# Patient Record
Sex: FEMALE | Race: WHITE | NOT HISPANIC OR LATINO | RURAL
[De-identification: names, ages, dates, MRNs, and addresses within clinical notes are randomized per-mention and may not be internally consistent; named-entity substitution may affect disease eponyms.]

---

## 2017-03-30 ENCOUNTER — LAB (OUTPATIENT)
Dept: LAB | Facility: CLINIC | Age: 72
End: 2017-03-30

## 2017-03-30 ENCOUNTER — OFFICE VISIT (OUTPATIENT)
Dept: ENDOCRINOLOGY | Facility: CLINIC | Age: 72
End: 2017-03-30

## 2017-03-30 VITALS
DIASTOLIC BLOOD PRESSURE: 60 MMHG | SYSTOLIC BLOOD PRESSURE: 100 MMHG | HEART RATE: 72 BPM | WEIGHT: 152 LBS | HEIGHT: 67 IN | BODY MASS INDEX: 23.86 KG/M2

## 2017-03-30 DIAGNOSIS — E78.49 OTHER HYPERLIPIDEMIA: ICD-10-CM

## 2017-03-30 DIAGNOSIS — E55.9 VITAMIN D DEFICIENCY: ICD-10-CM

## 2017-03-30 DIAGNOSIS — I10 ESSENTIAL HYPERTENSION: ICD-10-CM

## 2017-03-30 DIAGNOSIS — Z91.199 NONCOMPLIANCE WITH DIABETES TREATMENT: ICD-10-CM

## 2017-03-30 DIAGNOSIS — IMO0002 UNCONTROLLED TYPE 2 DIABETES MELLITUS WITH COMPLICATION, WITH LONG-TERM CURRENT USE OF INSULIN: ICD-10-CM

## 2017-03-30 DIAGNOSIS — IMO0002 UNCONTROLLED TYPE 2 DIABETES MELLITUS WITH COMPLICATION, WITH LONG-TERM CURRENT USE OF INSULIN: Primary | ICD-10-CM

## 2017-03-30 PROCEDURE — 80053 COMPREHEN METABOLIC PANEL: CPT | Performed by: NURSE PRACTITIONER

## 2017-03-30 PROCEDURE — 85025 COMPLETE CBC W/AUTO DIFF WBC: CPT | Performed by: NURSE PRACTITIONER

## 2017-03-30 PROCEDURE — 83036 HEMOGLOBIN GLYCOSYLATED A1C: CPT | Performed by: NURSE PRACTITIONER

## 2017-03-30 PROCEDURE — 99214 OFFICE O/P EST MOD 30 MIN: CPT | Performed by: NURSE PRACTITIONER

## 2017-03-30 PROCEDURE — 82306 VITAMIN D 25 HYDROXY: CPT | Performed by: NURSE PRACTITIONER

## 2017-03-30 NOTE — PROGRESS NOTES
Subjective    Jamilah Vee is a 71 y.o. female. she is here today for follow-up.    History of Present Illness         Duration/Timing:  Diabetes mellitus type 2, Age at onset of diabetes: 61 years, Onset of symptoms gradual  timing - constant     quality - not controlled     severity - high     Severity (Complications/Hospitalizations)  Secondary Macrovascular Complications:  CAD   2 stents in May 2014  ICD - defibrillator  Secondary Microvascular Complications:  No Diabetic Nephropathy, No proteinuria, No Diabetic Retinopathy, No Diabetic Neuropathy     Context  Diabetes Regimen:  Insulin, Oral Medications, Last HgbA1c% 11.4 October 2016   Blood Glucose Readings  states some improvement in numbers     she still forgets sometimes to cover the meals     and she is having a nighttime snack  Diet  low carb intake  Exercise:  Does not exercise     Associated Signs/Symptoms  Hyperglycemic Symptoms:  No polyuria, No polydipsia, No polyphagia  Hypoglycemic Episodes:  No documented hypoglycemia  Modifying Factors/Comorbidities:  Hypertension, Hyperlipidemia            The following portions of the patient's history were reviewed and updated as appropriate:   Past Medical History:   Diagnosis Date   • Dyslipidemia    • Essential hypertension    • Hypercalcemia    • Osteopenia    • Type II diabetes mellitus, uncontrolled    • Vitamin D deficiency      Past Surgical History:   Procedure Laterality Date   • PACEMAKER IMPLANTATION      Pacemaker AICD pocket (1)       Family History   Problem Relation Age of Onset   • Coronary artery disease Other    • Hypertension Other      OB History     No data available        Current Outpatient Prescriptions   Medication Sig Dispense Refill   • aspirin 81 MG tablet Take 81 mg by mouth daily.     • atorvastatin (LIPITOR) 40 MG tablet Take 40 mg by mouth daily.     • clopidogrel (PLAVIX) 75 MG tablet Take 75 mg by mouth daily.     • digoxin (LANOXIN) 125 MCG tablet Take 125 mcg by mouth  every day.     • furosemide (LASIX) 40 MG tablet Take 40 mg by mouth daily.     • glimepiride (AMARYL) 4 MG tablet Take 4 mg by mouth every morning before breakfast.     • insulin aspart (NOVOLOG FLEXPEN) 100 UNIT/ML solution pen-injector sc pen Inject 10-15 Units under the skin 3 (three) times a day before meals.     • Insulin Glargine (LANTUS SOLOSTAR) 100 UNIT/ML injection pen Inject 45 Units under the skin every night.     • insulin glargine (LANTUS) 100 UNIT/ML injection Inject 45 Units under the skin every night.     • Insulin Pen Needle (PEN NEEDLES) 31G X 8 MM misc 4 (four) times a day. Use as indicated 4 times daily.     • losartan (COZAAR) 25 MG tablet Take 25 mg by mouth daily.     • metFORMIN (GLUCOPHAGE) 1000 MG tablet Take 1,000 mg by mouth 2 (two) times a day.     • metoprolol tartrate (LOPRESSOR) 50 MG tablet Take 50 mg by mouth 2 (two) times a day.     • omeprazole (PriLOSEC) 20 MG capsule Take 20 mg by mouth daily.     • spironolactone (ALDACTONE) 25 MG tablet Take 25 mg by mouth daily.       No current facility-administered medications for this visit.      No Known Allergies  Social History     Social History   • Marital status: Unknown     Spouse name: N/A   • Number of children: N/A   • Years of education: N/A     Social History Main Topics   • Smoking status: Never Smoker   • Smokeless tobacco: None   • Alcohol use None   • Drug use: None   • Sexual activity: Not Asked     Other Topics Concern   • None     Social History Narrative       Review of Systems  Review of Systems   Constitutional: Negative for activity change, appetite change, chills, diaphoresis and fatigue.   HENT: Negative for congestion, dental problem, drooling, ear discharge, ear pain, facial swelling, sneezing, sore throat, tinnitus, trouble swallowing and voice change.    Eyes: Negative for photophobia, pain, discharge, redness, itching and visual disturbance.   Respiratory: Negative for apnea, cough, choking, chest tightness  "and shortness of breath.    Cardiovascular: Negative for chest pain, palpitations and leg swelling.   Gastrointestinal: Negative for abdominal distention, abdominal pain, constipation, diarrhea, nausea and vomiting.   Endocrine: Negative for cold intolerance, heat intolerance, polydipsia, polyphagia and polyuria.   Genitourinary: Negative for difficulty urinating, dysuria, frequency, hematuria and urgency.   Musculoskeletal: Negative for arthralgias, back pain, gait problem, joint swelling, myalgias, neck pain and neck stiffness.   Skin: Negative for color change, pallor, rash and wound.   Allergic/Immunologic: Negative for environmental allergies, food allergies and immunocompromised state.   Neurological: Negative for dizziness, tremors, facial asymmetry, weakness, light-headedness, numbness and headaches.   Hematological: Negative for adenopathy. Does not bruise/bleed easily.   Psychiatric/Behavioral: Negative for agitation, behavioral problems, confusion, decreased concentration and sleep disturbance.        Objective    /60 (BP Location: Left arm, Patient Position: Sitting, Cuff Size: Adult)  Pulse 72  Ht 66.5\" (168.9 cm)  Wt 152 lb (68.9 kg)  BMI 24.17 kg/m2  Physical Exam   Constitutional: She is oriented to person, place, and time. She appears well-developed and well-nourished. No distress.   HENT:   Head: Normocephalic and atraumatic.   Right Ear: External ear normal.   Left Ear: External ear normal.   Nose: Nose normal.   Eyes: Conjunctivae and EOM are normal. Pupils are equal, round, and reactive to light.   Neck: Normal range of motion. Neck supple. No tracheal deviation present. No thyromegaly present.   Cardiovascular: Normal rate, regular rhythm and normal heart sounds.    No murmur heard.  Pulmonary/Chest: Effort normal and breath sounds normal. No respiratory distress. She has no wheezes.   Abdominal: Soft. Bowel sounds are normal. There is no tenderness. There is no rebound and no " guarding.   Musculoskeletal: Normal range of motion. She exhibits no edema, tenderness or deformity.   Neurological: She is alert and oriented to person, place, and time. No cranial nerve deficit.   Skin: Skin is warm and dry. No rash noted.   Psychiatric: She has a normal mood and affect. Her behavior is normal. Judgment and thought content normal.       Lab Review  Glucose (mg/dl)   Date Value   10/06/2016 193 (H)   06/30/2016 228 (H)   02/05/2016 246 (H)     Sodium (mmol/L)   Date Value   10/06/2016 140   06/30/2016 137   02/05/2016 136 (L)     Potassium (mmol/L)   Date Value   10/06/2016 5.1   06/30/2016 4.9   02/05/2016 5.0     Chloride (mmol/L)   Date Value   10/06/2016 98   06/30/2016 95   02/05/2016 92 (L)     CO2 (mmol/L)   Date Value   10/06/2016 30   06/30/2016 29   02/05/2016 30     BUN (mg/dl)   Date Value   10/06/2016 23 (H)   06/30/2016 20   02/05/2016 25 (H)     Creatinine (mg/dl)   Date Value   10/06/2016 1.1 (H)   06/30/2016 0.9   02/05/2016 1.2 (H)     Hemoglobin A1C (%TotHgb)   Date Value   10/06/2016 11.4 (H)   06/30/2016 10.6 (H)   02/05/2016 11.2 (H)     Triglycerides (mg/dl)   Date Value   10/06/2016 193   06/30/2016 283 (H)   08/25/2015 330 (H)       Assessment/Plan      1. Uncontrolled type 2 diabetes mellitus with complication, with long-term current use of insulin    2. Essential hypertension    3. Other hyperlipidemia    4. Vitamin D deficiency    .    Medications prescribed:  Outpatient Encounter Prescriptions as of 3/30/2017   Medication Sig Dispense Refill   • aspirin 81 MG tablet Take 81 mg by mouth daily.     • atorvastatin (LIPITOR) 40 MG tablet Take 40 mg by mouth daily.     • clopidogrel (PLAVIX) 75 MG tablet Take 75 mg by mouth daily.     • digoxin (LANOXIN) 125 MCG tablet Take 125 mcg by mouth every day.     • furosemide (LASIX) 40 MG tablet Take 40 mg by mouth daily.     • glimepiride (AMARYL) 4 MG tablet Take 4 mg by mouth every morning before breakfast.     • insulin aspart  (NOVOLOG FLEXPEN) 100 UNIT/ML solution pen-injector sc pen Inject 10-15 Units under the skin 3 (three) times a day before meals.     • Insulin Glargine (LANTUS SOLOSTAR) 100 UNIT/ML injection pen Inject 45 Units under the skin every night.     • insulin glargine (LANTUS) 100 UNIT/ML injection Inject 45 Units under the skin every night.     • Insulin Pen Needle (PEN NEEDLES) 31G X 8 MM misc 4 (four) times a day. Use as indicated 4 times daily.     • losartan (COZAAR) 25 MG tablet Take 25 mg by mouth daily.     • metFORMIN (GLUCOPHAGE) 1000 MG tablet Take 1,000 mg by mouth 2 (two) times a day.     • metoprolol tartrate (LOPRESSOR) 50 MG tablet Take 50 mg by mouth 2 (two) times a day.     • omeprazole (PriLOSEC) 20 MG capsule Take 20 mg by mouth daily.     • spironolactone (ALDACTONE) 25 MG tablet Take 25 mg by mouth daily.       No facility-administered encounter medications on file as of 3/30/2017.        Orders placed during this encounter include:  Orders Placed This Encounter   Procedures   • Comprehensive Metabolic Panel   • Hemoglobin A1c   • Vitamin D 25 Hydroxy         Glycemic Management  Lantus  45 units --increase to 55--decrease to 45 units--change to [place] due to uncontrolled blood sugars - increase to 55 units         if you wake up with a sugar in the morning  less than 100 -- decrease by 5 units and don't raise it again      --------     Janumet 100/1000 mg with supper----stopped due to expensive      metformin 1000mg one at supper --not taking -- try taking 500 mg         ----------     stop glimepiride since not enough        -----------     Novolog  every meal-----forgets to take --please take with every meal         151-200 : 2 units   201-250 : 4units  251-300 : 6units  above 300 : 7 units     +     2 units for every 15 grams of carbohydrate for supper---taking  3 units per 15 grams of CHO-increase to 4 units per 15 grams of CHO              please cover your nighttime snack       Lipid  Management      ldl - 61 feb. 2015     TG - 300     does not want to take any mediations - will try fish oil 1gram two capsules po BID     May 2015     tg - 300     did not start fish oil   refuses prescription medication     June 2016     Tg- 283     Oct. 2016     Total chol - 134  Tg- 193  HDL - 36  LDL - 59      Blood Pressure Management  on metoprolol 50 bid  ---Decrease to 1/2 tablet po BID  aldactone 25 mg qd  losartan 25 mg qd  furosemide 40 mg qd      Microvascular Complication Monitoring    eye exam -- les sthan one year      Bone Health           Oct. 2015     DXA - osteopenia forearm     taking vit d and caclium     vit a - nl  vit d 1-25 - nl     June 2016     vit d - nl  calcium 10.3     Oct. 2016     Vit d  - nl  Calcium - 10.0     Other Diabetes Related Aspects    Feb. 2015     TSH - nl     Feb. 2016     TSH - nl          Need new labs today    4. Follow-up: Return in about 4 months (around 7/30/2017) for Recheck.

## 2017-03-31 LAB
25(OH)D3 SERPL-MCNC: 18.3 NG/ML (ref 30–100)
ALBUMIN SERPL-MCNC: 4.2 G/DL (ref 3.4–4.8)
ALBUMIN/GLOB SERPL: 1.4 G/DL (ref 1.1–1.8)
ALP SERPL-CCNC: 47 U/L (ref 38–126)
ALT SERPL W P-5'-P-CCNC: 31 U/L (ref 9–52)
ANION GAP SERPL CALCULATED.3IONS-SCNC: 13 MMOL/L (ref 5–15)
AST SERPL-CCNC: 30 U/L (ref 14–36)
BASOPHILS # BLD AUTO: 0.04 10*3/MM3 (ref 0–0.2)
BASOPHILS NFR BLD AUTO: 0.6 % (ref 0–2)
BILIRUB SERPL-MCNC: 1.1 MG/DL (ref 0.2–1.3)
BUN BLD-MCNC: 21 MG/DL (ref 7–21)
BUN/CREAT SERPL: 19.1 (ref 7–25)
CALCIUM SPEC-SCNC: 10.8 MG/DL (ref 8.4–10.2)
CHLORIDE SERPL-SCNC: 95 MMOL/L (ref 95–110)
CO2 SERPL-SCNC: 30 MMOL/L (ref 22–31)
CREAT BLD-MCNC: 1.1 MG/DL (ref 0.5–1)
DEPRECATED RDW RBC AUTO: 44.1 FL (ref 36.4–46.3)
EOSINOPHIL # BLD AUTO: 0.1 10*3/MM3 (ref 0–0.7)
EOSINOPHIL NFR BLD AUTO: 1.4 % (ref 0–7)
ERYTHROCYTE [DISTWIDTH] IN BLOOD BY AUTOMATED COUNT: 13.9 % (ref 11.5–14.5)
GFR SERPL CREATININE-BSD FRML MDRD: 49 ML/MIN/1.73 (ref 60–90)
GLOBULIN UR ELPH-MCNC: 2.9 GM/DL (ref 2.3–3.5)
GLUCOSE BLD-MCNC: 117 MG/DL (ref 60–100)
HBA1C MFR BLD: 13.83 % (ref 4–5.6)
HCT VFR BLD AUTO: 42.8 % (ref 35–45)
HGB BLD-MCNC: 14.3 G/DL (ref 12–15.5)
IMM GRANULOCYTES # BLD: 0.02 10*3/MM3 (ref 0–0.02)
IMM GRANULOCYTES NFR BLD: 0.3 % (ref 0–0.5)
LYMPHOCYTES # BLD AUTO: 1.38 10*3/MM3 (ref 0.6–4.2)
LYMPHOCYTES NFR BLD AUTO: 19.2 % (ref 10–50)
MCH RBC QN AUTO: 29.1 PG (ref 26.5–34)
MCHC RBC AUTO-ENTMCNC: 33.4 G/DL (ref 31.4–36)
MCV RBC AUTO: 87.2 FL (ref 80–98)
MONOCYTES # BLD AUTO: 0.62 10*3/MM3 (ref 0–0.9)
MONOCYTES NFR BLD AUTO: 8.6 % (ref 0–12)
NEUTROPHILS # BLD AUTO: 5.01 10*3/MM3 (ref 2–8.6)
NEUTROPHILS NFR BLD AUTO: 69.9 % (ref 37–80)
PLATELET # BLD AUTO: 153 10*3/MM3 (ref 150–450)
PMV BLD AUTO: 13.1 FL (ref 8–12)
POTASSIUM BLD-SCNC: 4.3 MMOL/L (ref 3.5–5.1)
PROT SERPL-MCNC: 7.1 G/DL (ref 6.3–8.6)
RBC # BLD AUTO: 4.91 10*6/MM3 (ref 3.77–5.16)
SODIUM BLD-SCNC: 138 MMOL/L (ref 137–145)
WBC NRBC COR # BLD: 7.17 10*3/MM3 (ref 3.2–9.8)

## 2017-04-03 ENCOUNTER — TELEPHONE (OUTPATIENT)
Dept: ENDOCRINOLOGY | Facility: CLINIC | Age: 72
End: 2017-04-03

## 2017-04-03 RX ORDER — ERGOCALCIFEROL 1.25 MG/1
50000 CAPSULE ORAL
Qty: 5 CAPSULE | Refills: 5 | Status: SHIPPED | OUTPATIENT
Start: 2017-04-03 | End: 2019-06-20

## 2017-04-03 NOTE — TELEPHONE ENCOUNTER
----- Message from ZAHIRA Sher sent at 4/3/2017  7:55 AM CDT -----  A1c is 13.8 higher than previous of 11.4; the average blood glucose is 370; really needs to make sure giving the insulin; watch diet the A1c needs to be 7 or less

## 2017-04-03 NOTE — TELEPHONE ENCOUNTER
----- Message from ZAHIRA Sher sent at 4/3/2017  7:55 AM CDT -----  Needs vitamin d 50,000 units weekly level is 18.3

## 2017-06-13 ENCOUNTER — TELEPHONE (OUTPATIENT)
Dept: ENDOCRINOLOGY | Facility: CLINIC | Age: 72
End: 2017-06-13

## 2017-07-26 ENCOUNTER — TELEPHONE (OUTPATIENT)
Dept: ENDOCRINOLOGY | Facility: CLINIC | Age: 72
End: 2017-07-26

## 2017-07-26 NOTE — TELEPHONE ENCOUNTER
PT IS NEEDING A REFILL ON HER FREESTYLE LIGHT TESTING STRIPS AND PLEASE SEND THEM TO THE "[facility] IN [place], [place]. THANK YOU TP

## 2017-08-03 ENCOUNTER — TELEPHONE (OUTPATIENT)
Dept: ENDOCRINOLOGY | Facility: CLINIC | Age: 72
End: 2017-08-03

## 2017-11-08 ENCOUNTER — TELEPHONE (OUTPATIENT)
Dept: ENDOCRINOLOGY | Facility: CLINIC | Age: 72
End: 2017-11-08

## 2017-11-08 DIAGNOSIS — E55.9 VITAMIN D DEFICIENCY: Primary | ICD-10-CM

## 2017-11-08 DIAGNOSIS — E78.49 OTHER HYPERLIPIDEMIA: ICD-10-CM

## 2017-11-08 DIAGNOSIS — Z79.4 CONTROLLED TYPE 2 DIABETES MELLITUS WITHOUT COMPLICATION, WITH LONG-TERM CURRENT USE OF INSULIN (HCC): ICD-10-CM

## 2017-11-08 DIAGNOSIS — E11.9 CONTROLLED TYPE 2 DIABETES MELLITUS WITHOUT COMPLICATION, WITH LONG-TERM CURRENT USE OF INSULIN (HCC): ICD-10-CM

## 2017-11-14 LAB
25(OH)D3 SERPL-MCNC: 16.3 NG/ML (ref 30–100)
ALBUMIN SERPL-MCNC: 4 G/DL (ref 3.4–4.8)
ALBUMIN UR-MCNC: <0.6 MG/L
ALBUMIN/GLOB SERPL: 1.2 G/DL (ref 1.1–1.8)
ALP SERPL-CCNC: 62 U/L (ref 38–126)
ALT SERPL W P-5'-P-CCNC: 57 U/L (ref 9–52)
ANION GAP SERPL CALCULATED.3IONS-SCNC: 15 MMOL/L (ref 5–15)
ARTICHOKE IGE QN: 53 MG/DL (ref 1–129)
AST SERPL-CCNC: 53 U/L (ref 14–36)
BASOPHILS # BLD AUTO: 0.02 10*3/MM3 (ref 0–0.2)
BASOPHILS NFR BLD AUTO: 0.3 % (ref 0–2)
BILIRUB SERPL-MCNC: 0.7 MG/DL (ref 0.2–1.3)
BUN BLD-MCNC: 30 MG/DL (ref 7–21)
BUN/CREAT SERPL: 21.4 (ref 7–25)
CALCIUM SPEC-SCNC: 9.6 MG/DL (ref 8.4–10.2)
CHLORIDE SERPL-SCNC: 95 MMOL/L (ref 95–110)
CHOLEST SERPL-MCNC: 127 MG/DL (ref 0–199)
CO2 SERPL-SCNC: 33 MMOL/L (ref 22–31)
CREAT BLD-MCNC: 1.4 MG/DL (ref 0.5–1)
CREAT UR-MCNC: 142.9 MG/DL
CREAT UR-MCNC: 142.9 MG/DL
DEPRECATED RDW RBC AUTO: 53.5 FL (ref 36.4–46.3)
EOSINOPHIL # BLD AUTO: 0.06 10*3/MM3 (ref 0–0.7)
EOSINOPHIL NFR BLD AUTO: 0.8 % (ref 0–7)
ERYTHROCYTE [DISTWIDTH] IN BLOOD BY AUTOMATED COUNT: 16.2 % (ref 11.5–14.5)
GFR SERPL CREATININE-BSD FRML MDRD: 37 ML/MIN/1.73 (ref 39–90)
GLOBULIN UR ELPH-MCNC: 3.3 GM/DL (ref 2.3–3.5)
GLUCOSE BLD-MCNC: 98 MG/DL (ref 60–100)
HCT VFR BLD AUTO: 41.2 % (ref 35–45)
HDLC SERPL-MCNC: 50 MG/DL (ref 60–200)
HGB BLD-MCNC: 13.1 G/DL (ref 12–15.5)
IMM GRANULOCYTES # BLD: 0.02 10*3/MM3 (ref 0–0.02)
IMM GRANULOCYTES NFR BLD: 0.3 % (ref 0–0.5)
LDLC/HDLC SERPL: 0.98 {RATIO} (ref 0–3.22)
LYMPHOCYTES # BLD AUTO: 0.84 10*3/MM3 (ref 0.6–4.2)
LYMPHOCYTES NFR BLD AUTO: 11.6 % (ref 10–50)
MCH RBC QN AUTO: 28.4 PG (ref 26.5–34)
MCHC RBC AUTO-ENTMCNC: 31.8 G/DL (ref 31.4–36)
MCV RBC AUTO: 89.4 FL (ref 80–98)
MICROALBUMIN/CREAT UR: NORMAL MG/G (ref 0–30)
MONOCYTES # BLD AUTO: 0.68 10*3/MM3 (ref 0–0.9)
MONOCYTES NFR BLD AUTO: 9.4 % (ref 0–12)
NEUTROPHILS # BLD AUTO: 5.63 10*3/MM3 (ref 2–8.6)
NEUTROPHILS NFR BLD AUTO: 77.6 % (ref 37–80)
NRBC BLD MANUAL-RTO: 0 /100 WBC (ref 0–0)
PLATELET # BLD AUTO: 197 10*3/MM3 (ref 150–450)
PMV BLD AUTO: 11.4 FL (ref 8–12)
POTASSIUM BLD-SCNC: 4.1 MMOL/L (ref 3.5–5.1)
PROT SERPL-MCNC: 7.3 G/DL (ref 6.3–8.6)
PROT UR-MCNC: 6.9 MG/DL
PROT/CREAT UR: 48.3 MG/G CREA (ref 0–200)
RBC # BLD AUTO: 4.61 10*6/MM3 (ref 3.77–5.16)
SODIUM BLD-SCNC: 143 MMOL/L (ref 137–145)
TRIGL SERPL-MCNC: 139 MG/DL (ref 20–199)
TSH SERPL DL<=0.05 MIU/L-ACNC: 4.68 MIU/ML (ref 0.46–4.68)
VIT B12 BLD-MCNC: 382 PG/ML (ref 239–931)
WBC NRBC COR # BLD: 7.25 10*3/MM3 (ref 3.2–9.8)

## 2017-11-14 PROCEDURE — 80061 LIPID PANEL: CPT | Performed by: NURSE PRACTITIONER

## 2017-11-14 PROCEDURE — 36415 COLL VENOUS BLD VENIPUNCTURE: CPT | Performed by: FAMILY MEDICINE

## 2017-11-14 PROCEDURE — 82607 VITAMIN B-12: CPT | Performed by: NURSE PRACTITIONER

## 2017-11-14 PROCEDURE — 85025 COMPLETE CBC W/AUTO DIFF WBC: CPT | Performed by: NURSE PRACTITIONER

## 2017-11-14 PROCEDURE — 82570 ASSAY OF URINE CREATININE: CPT | Performed by: NURSE PRACTITIONER

## 2017-11-14 PROCEDURE — 82306 VITAMIN D 25 HYDROXY: CPT | Performed by: NURSE PRACTITIONER

## 2017-11-14 PROCEDURE — 84443 ASSAY THYROID STIM HORMONE: CPT | Performed by: NURSE PRACTITIONER

## 2017-11-14 PROCEDURE — 82043 UR ALBUMIN QUANTITATIVE: CPT | Performed by: NURSE PRACTITIONER

## 2017-11-14 PROCEDURE — 84156 ASSAY OF PROTEIN URINE: CPT | Performed by: NURSE PRACTITIONER

## 2017-11-14 PROCEDURE — 80053 COMPREHEN METABOLIC PANEL: CPT | Performed by: NURSE PRACTITIONER

## 2017-11-14 PROCEDURE — 83036 HEMOGLOBIN GLYCOSYLATED A1C: CPT | Performed by: NURSE PRACTITIONER

## 2017-11-15 LAB — HBA1C MFR BLD: 9.8 % (ref 4–5.6)

## 2017-11-20 ENCOUNTER — OFFICE VISIT (OUTPATIENT)
Dept: ENDOCRINOLOGY | Facility: CLINIC | Age: 72
End: 2017-11-20

## 2017-11-20 VITALS
BODY MASS INDEX: 23.86 KG/M2 | DIASTOLIC BLOOD PRESSURE: 68 MMHG | SYSTOLIC BLOOD PRESSURE: 124 MMHG | HEIGHT: 67 IN | WEIGHT: 152 LBS | HEART RATE: 56 BPM

## 2017-11-20 DIAGNOSIS — E78.49 OTHER HYPERLIPIDEMIA: ICD-10-CM

## 2017-11-20 DIAGNOSIS — E55.9 VITAMIN D DEFICIENCY: ICD-10-CM

## 2017-11-20 DIAGNOSIS — I10 ESSENTIAL HYPERTENSION: ICD-10-CM

## 2017-11-20 DIAGNOSIS — IMO0002 UNCONTROLLED TYPE 2 DIABETES MELLITUS WITH COMPLICATION, WITH LONG-TERM CURRENT USE OF INSULIN: Primary | ICD-10-CM

## 2017-11-20 PROCEDURE — 99214 OFFICE O/P EST MOD 30 MIN: CPT | Performed by: NURSE PRACTITIONER

## 2017-11-20 RX ORDER — BUMETANIDE 1 MG/1
2 TABLET ORAL DAILY
COMMUNITY
Start: 2017-09-30

## 2017-11-20 RX ORDER — AMIODARONE HYDROCHLORIDE 200 MG/1
200 TABLET ORAL
COMMUNITY
Start: 2017-11-16 | End: 2018-11-17

## 2017-11-20 NOTE — PROGRESS NOTES
Subjective    Jamilah Vee is a 72 y.o. female. she is here today for follow-up.    History of Present Illness     Duration/Timing:  Diabetes mellitus type 2, Age at onset of diabetes: 61 years, Onset of symptoms gradual  timing - constant     quality - not controlled     severity - high     Severity (Complications/Hospitalizations)  Secondary Macrovascular Complications:  CAD   2 stents in May 2014  ICD - defibrillator  Secondary Microvascular Complications:  No Diabetic Nephropathy, No proteinuria, No Diabetic Retinopathy, No Diabetic Neuropathy     Context  Diabetes Regimen:  Insulin, Oral Medications, Last HgbA1c% 11.4 October 2016     Lab Results   Component Value Date    HGBA1C 9.8 (H) 11/14/2017       Blood Glucose Readings      No low sugars since last visit    States some at goal       Diet  low carb intake  Exercise:  Does not exercise     Associated Signs/Symptoms  Hyperglycemic Symptoms:  No polyuria, No polydipsia, No polyphagia  Hypoglycemic Episodes:  No documented hypoglycemia  Modifying Factors/Comorbidities:  Hypertension, Hyperlipidemia                 The following portions of the patient's history were reviewed and updated as appropriate:   Past Medical History:   Diagnosis Date   • Dyslipidemia    • Essential hypertension    • Hypercalcemia    • Osteopenia    • Type II diabetes mellitus, uncontrolled    • Vitamin D deficiency      Past Surgical History:   Procedure Laterality Date   • PACEMAKER IMPLANTATION      Pacemaker AICD pocket (1)       Family History   Problem Relation Age of Onset   • Coronary artery disease Other    • Hypertension Other      OB History     No data available        Current Outpatient Prescriptions   Medication Sig Dispense Refill   • amiodarone (PACERONE) 200 MG tablet Take 200 mg by mouth.     • aspirin 81 MG tablet Take 81 mg by mouth daily.     • atorvastatin (LIPITOR) 40 MG tablet Take 40 mg by mouth daily.     • bumetanide (BUMEX) 1 MG tablet      •  clopidogrel (PLAVIX) 75 MG tablet Take 75 mg by mouth daily.     • insulin aspart (NOVOLOG FLEXPEN) 100 UNIT/ML solution pen-injector sc pen Inject 10-15 Units under the skin 3 (three) times a day before meals.     • Insulin Glargine (LANTUS SOLOSTAR) 100 UNIT/ML injection pen Inject 50 Units under the skin Every Night. 45 mL 5   • Insulin Pen Needle (PEN NEEDLES) 31G X 8 MM misc 4 (four) times a day. Use as indicated 4 times daily.     • losartan (COZAAR) 25 MG tablet Take 25 mg by mouth daily.     • metFORMIN (GLUCOPHAGE) 1000 MG tablet Take 1,000 mg by mouth 2 (two) times a day.     • metoprolol tartrate (LOPRESSOR) 50 MG tablet Take 50 mg by mouth 2 (two) times a day.     • spironolactone (ALDACTONE) 25 MG tablet Take 25 mg by mouth daily.     • vitamin D (ERGOCALCIFEROL) 25475 UNITS capsule capsule Take 1 capsule by mouth Every 7 (Seven) Days. 5 capsule 5     No current facility-administered medications for this visit.      No Known Allergies  Social History     Social History   • Marital status: Unknown     Spouse name: N/A   • Number of children: N/A   • Years of education: N/A     Social History Main Topics   • Smoking status: Never Smoker   • Smokeless tobacco: Never Used   • Alcohol use No   • Drug use: None   • Sexual activity: Not Asked     Other Topics Concern   • None     Social History Narrative       Review of Systems  Review of Systems   Constitutional: Negative for activity change, appetite change, diaphoresis and fatigue.   HENT: Negative for congestion, dental problem, drooling, facial swelling, sneezing, sore throat, tinnitus, trouble swallowing and voice change.    Eyes: Negative for photophobia, pain, discharge, redness, itching and visual disturbance.   Respiratory: Negative for apnea, cough, choking, chest tightness and shortness of breath.    Cardiovascular: Negative for chest pain, palpitations and leg swelling.   Gastrointestinal: Negative for abdominal distention, abdominal pain,  "constipation, diarrhea, nausea and vomiting.   Endocrine: Negative for cold intolerance, heat intolerance, polydipsia, polyphagia and polyuria.   Genitourinary: Negative for difficulty urinating, dysuria, frequency, hematuria and urgency.   Musculoskeletal: Negative for arthralgias, back pain, gait problem, joint swelling, myalgias, neck pain and neck stiffness.   Skin: Negative for color change, pallor, rash and wound.   Allergic/Immunologic: Negative for environmental allergies and food allergies.   Neurological: Negative for dizziness, tremors, facial asymmetry, weakness, light-headedness, numbness and headaches.   Hematological: Negative for adenopathy. Does not bruise/bleed easily.   Psychiatric/Behavioral: Negative for agitation, behavioral problems, confusion and sleep disturbance.        Objective    /68 (BP Location: Left arm, Patient Position: Sitting, Cuff Size: Adult)  Pulse 56  Ht 66.5\" (168.9 cm)  Wt 152 lb (68.9 kg)  BMI 24.17 kg/m2  Physical Exam   Constitutional: She is oriented to person, place, and time. She appears well-developed and well-nourished. No distress.   HENT:   Head: Normocephalic and atraumatic.   Right Ear: External ear normal.   Left Ear: External ear normal.   Nose: Nose normal.   Eyes: Conjunctivae and EOM are normal. Pupils are equal, round, and reactive to light.   Neck: Normal range of motion. Neck supple. No tracheal deviation present. No thyromegaly present.   Cardiovascular: Normal rate, regular rhythm and normal heart sounds.    No murmur heard.  Pulmonary/Chest: Effort normal and breath sounds normal. No respiratory distress. She has no wheezes.   Abdominal: Soft. Bowel sounds are normal. There is no tenderness. There is no rebound and no guarding.   Musculoskeletal: Normal range of motion. She exhibits no edema, tenderness or deformity.   Neurological: She is alert and oriented to person, place, and time. No cranial nerve deficit.   Skin: Skin is warm and dry. " No rash noted.   Psychiatric: She has a normal mood and affect. Her behavior is normal. Judgment and thought content normal.       Lab Review  Glucose   Date Value   11/14/2017 98 mg/dL   03/30/2017 117 mg/dL (H)   10/06/2016 193 mg/dl (H)   06/30/2016 228 mg/dl (H)   02/05/2016 246 mg/dl (H)     Sodium (mmol/L)   Date Value   11/14/2017 143   03/30/2017 138   10/06/2016 140   06/30/2016 137   02/05/2016 136 (L)     Potassium (mmol/L)   Date Value   11/14/2017 4.1   03/30/2017 4.3   10/06/2016 5.1   06/30/2016 4.9   02/05/2016 5.0     Chloride (mmol/L)   Date Value   11/14/2017 95   03/30/2017 95   10/06/2016 98   06/30/2016 95   02/05/2016 92 (L)     CO2 (mmol/L)   Date Value   11/14/2017 33.0 (H)   03/30/2017 30.0   10/06/2016 30   06/30/2016 29   02/05/2016 30     BUN   Date Value   11/14/2017 30 mg/dL (H)   03/30/2017 21 mg/dL   10/06/2016 23 mg/dl (H)   06/30/2016 20 mg/dl   02/05/2016 25 mg/dl (H)     Creatinine   Date Value   11/14/2017 1.40 mg/dL (H)   03/30/2017 1.10 mg/dL (H)   10/06/2016 1.1 mg/dl (H)   06/30/2016 0.9 mg/dl   02/05/2016 1.2 mg/dl (H)     Hemoglobin A1C   Date Value   11/14/2017 9.8 % (H)   03/30/2017 13.83 % (H)   10/06/2016 11.4 %TotHgb (H)   06/30/2016 10.6 %TotHgb (H)   02/05/2016 11.2 %TotHgb (H)     Triglycerides   Date Value   11/14/2017 139 mg/dL   10/06/2016 193 mg/dl   06/30/2016 283 mg/dl (H)   08/25/2015 330 mg/dl (H)       Assessment/Plan      1. Uncontrolled type 2 diabetes mellitus with complication, with long-term current use of insulin    2. Essential hypertension    3. Other hyperlipidemia    4. Vitamin D deficiency    .    Medications prescribed:  Outpatient Encounter Prescriptions as of 11/20/2017   Medication Sig Dispense Refill   • amiodarone (PACERONE) 200 MG tablet Take 200 mg by mouth.     • aspirin 81 MG tablet Take 81 mg by mouth daily.     • atorvastatin (LIPITOR) 40 MG tablet Take 40 mg by mouth daily.     • bumetanide (BUMEX) 1 MG tablet      • clopidogrel  (PLAVIX) 75 MG tablet Take 75 mg by mouth daily.     • insulin aspart (NOVOLOG FLEXPEN) 100 UNIT/ML solution pen-injector sc pen Inject 10-15 Units under the skin 3 (three) times a day before meals.     • Insulin Glargine (LANTUS SOLOSTAR) 100 UNIT/ML injection pen Inject 50 Units under the skin Every Night. 45 mL 5   • Insulin Pen Needle (PEN NEEDLES) 31G X 8 MM misc 4 (four) times a day. Use as indicated 4 times daily.     • losartan (COZAAR) 25 MG tablet Take 25 mg by mouth daily.     • metFORMIN (GLUCOPHAGE) 1000 MG tablet Take 1,000 mg by mouth 2 (two) times a day.     • metoprolol tartrate (LOPRESSOR) 50 MG tablet Take 50 mg by mouth 2 (two) times a day.     • spironolactone (ALDACTONE) 25 MG tablet Take 25 mg by mouth daily.     • vitamin D (ERGOCALCIFEROL) 63165 UNITS capsule capsule Take 1 capsule by mouth Every 7 (Seven) Days. 5 capsule 5   • [DISCONTINUED] digoxin (LANOXIN) 125 MCG tablet Take 125 mcg by mouth every day.     • [DISCONTINUED] furosemide (LASIX) 40 MG tablet Take 40 mg by mouth daily.     • [DISCONTINUED] glimepiride (AMARYL) 4 MG tablet Take 4 mg by mouth every morning before breakfast.     • [DISCONTINUED] Insulin Glargine (LANTUS SOLOSTAR) 100 UNIT/ML injection pen Inject 45 Units under the skin every night.     • [DISCONTINUED] insulin glargine (LANTUS) 100 UNIT/ML injection Inject 45 Units under the skin every night.     • [DISCONTINUED] omeprazole (PriLOSEC) 20 MG capsule Take 20 mg by mouth daily.       No facility-administered encounter medications on file as of 11/20/2017.        Orders placed during this encounter include:  Orders Placed This Encounter   Procedures   • Comprehensive Metabolic Panel   • Hemoglobin A1c   • Vitamin D 25 Hydroxy   • CBC & Differential     Order Specific Question:   Manual Differential     Answer:   No     Glycemic Management    Lab Results   Component Value Date    HGBA1C 9.8 (H) 11/14/2017       Lantus  45 units --increase to 50 units        if you  wake up with a sugar in the morning  less than 100 -- decrease by 5 units and don't raise it again      --------     Janumet 100/1000 mg with supper----stopped due to expensive      metformin 1000mg one at supper --not taking -- try taking 500 mg once at night         ----------     stop glimepiride since not enough        -----------     Novolog  every meal-----forgets to take --please take with every meal         151-200 : 2 units   201-250 : 4units  251-300 : 6units  above 300 : 7 units     +     3 units per 15 grams of CHO increase to  4 units per 15 grams of CHO              please cover your nighttime snack         Lipid Management        ldl - 61 feb. 2015     TG - 300     does not want to take any mediations - will try fish oil 1gram two capsules po BID     May 2015     tg - 300     did not start fish oil   refuses prescription medication     Total Cholesterol   Date Value Ref Range Status   11/14/2017 127 0 - 199 mg/dL Final     Triglycerides   Date Value Ref Range Status   11/14/2017 139 20 - 199 mg/dL Final     HDL Cholesterol   Date Value Ref Range Status   11/14/2017 50 (L) 60 - 200 mg/dL Final     LDL Cholesterol    Date Value Ref Range Status   11/14/2017 53 1 - 129 mg/dL Final           Blood Pressure Management  on metoprolol 50 bid  ---Decrease to 1/2 tablet po BID  aldactone 25 mg qd  losartan 25 mg qd  furosemide 40 mg qd    Was in hospital on high doses of diuretics and states kidney function was high but improving         Microvascular Complication Monitoring     eye exam -- les sthan one year     Component      Latest Ref Rng & Units 11/14/2017 11/14/2017           8:42 AM  8:42 AM   Protein/Creatinine Ratio, Urine      0.0 - 200.0 mg/G Crea 48.3    Creatinine, Urine      mg/dL 142.9 142.9   Total Protein, Urine      mg/dL 6.9    Microalbumin/Creatinine Ratio      0.0 - 30.0 mg/g     Microalbumin, Urine      mg/L  <0.6        Bone Health           Oct. 2015     DXA - osteopenia forearm      taking vit d and caclium       Oct. 2016     Vit d  - nl  Calcium - 10.0     Nov. 2017    Vitamin d - 16      Other Diabetes Related Aspects          Lab Results   Component Value Date    [id number] 11/14/2017     Lab Results   Component Value Date    TSH 4.680 11/14/2017         4. Follow-up: Return in about 3 months (around 2/20/2018) for Recheck.

## 2018-01-19 ENCOUNTER — TELEPHONE (OUTPATIENT)
Dept: ENDOCRINOLOGY | Facility: CLINIC | Age: 73
End: 2018-01-19

## 2018-01-19 NOTE — TELEPHONE ENCOUNTER
PT IS NEEDING A REFILL ON HER METFORMIN AND PEN NEEDLES THAT SHE USES WITH HER LANTUS. PLEASE SEND THESE TO [email or web address] IN [place], [place]. THANK YOU TP (Routing comment)

## 2018-02-15 LAB
25(OH)D3 SERPL-MCNC: 38.3 NG/ML (ref 30–100)
ALBUMIN SERPL-MCNC: 4 G/DL (ref 3.4–4.8)
ALBUMIN/GLOB SERPL: 1.2 G/DL (ref 1.1–1.8)
ALP SERPL-CCNC: 52 U/L (ref 38–126)
ALT SERPL W P-5'-P-CCNC: 53 U/L (ref 9–52)
ANION GAP SERPL CALCULATED.3IONS-SCNC: 14 MMOL/L (ref 5–15)
AST SERPL-CCNC: 38 U/L (ref 14–36)
BASOPHILS # BLD AUTO: 0.03 10*3/MM3 (ref 0–0.2)
BASOPHILS NFR BLD AUTO: 0.5 % (ref 0–2)
BILIRUB SERPL-MCNC: 0.7 MG/DL (ref 0.2–1.3)
BUN BLD-MCNC: 45 MG/DL (ref 7–21)
BUN/CREAT SERPL: 22.8 (ref 7–25)
CALCIUM SPEC-SCNC: 9.8 MG/DL (ref 8.4–10.2)
CHLORIDE SERPL-SCNC: 95 MMOL/L (ref 95–110)
CO2 SERPL-SCNC: 27 MMOL/L (ref 22–31)
CREAT BLD-MCNC: 1.97 MG/DL (ref 0.5–1)
DEPRECATED RDW RBC AUTO: 48.4 FL (ref 36.4–46.3)
EOSINOPHIL # BLD AUTO: 0.09 10*3/MM3 (ref 0–0.7)
EOSINOPHIL NFR BLD AUTO: 1.6 % (ref 0–7)
ERYTHROCYTE [DISTWIDTH] IN BLOOD BY AUTOMATED COUNT: 14.7 % (ref 11.5–14.5)
GFR SERPL CREATININE-BSD FRML MDRD: 25 ML/MIN/1.73 (ref 39–90)
GLOBULIN UR ELPH-MCNC: 3.3 GM/DL (ref 2.3–3.5)
GLUCOSE BLD-MCNC: 118 MG/DL (ref 60–100)
HBA1C MFR BLD: 8.3 % (ref 4–5.6)
HCT VFR BLD AUTO: 41.4 % (ref 35–45)
HGB BLD-MCNC: 13.3 G/DL (ref 12–15.5)
IMM GRANULOCYTES # BLD: 0.05 10*3/MM3 (ref 0–0.02)
IMM GRANULOCYTES NFR BLD: 0.9 % (ref 0–0.5)
LYMPHOCYTES # BLD AUTO: 1.05 10*3/MM3 (ref 0.6–4.2)
LYMPHOCYTES NFR BLD AUTO: 18.5 % (ref 10–50)
MCH RBC QN AUTO: 29 PG (ref 26.5–34)
MCHC RBC AUTO-ENTMCNC: 32.1 G/DL (ref 31.4–36)
MCV RBC AUTO: 90.4 FL (ref 80–98)
MONOCYTES # BLD AUTO: 0.58 10*3/MM3 (ref 0–0.9)
MONOCYTES NFR BLD AUTO: 10.2 % (ref 0–12)
NEUTROPHILS # BLD AUTO: 3.87 10*3/MM3 (ref 2–8.6)
NEUTROPHILS NFR BLD AUTO: 68.3 % (ref 37–80)
PLATELET # BLD AUTO: 255 10*3/MM3 (ref 150–450)
PMV BLD AUTO: 11.3 FL (ref 8–12)
POTASSIUM BLD-SCNC: 4.6 MMOL/L (ref 3.5–5.1)
PROT SERPL-MCNC: 7.3 G/DL (ref 6.3–8.6)
RBC # BLD AUTO: 4.58 10*6/MM3 (ref 3.77–5.16)
SODIUM BLD-SCNC: 136 MMOL/L (ref 137–145)
WBC NRBC COR # BLD: 5.67 10*3/MM3 (ref 3.2–9.8)

## 2018-02-15 PROCEDURE — 83036 HEMOGLOBIN GLYCOSYLATED A1C: CPT | Performed by: NURSE PRACTITIONER

## 2018-02-15 PROCEDURE — 82306 VITAMIN D 25 HYDROXY: CPT | Performed by: NURSE PRACTITIONER

## 2018-02-15 PROCEDURE — 80053 COMPREHEN METABOLIC PANEL: CPT | Performed by: NURSE PRACTITIONER

## 2018-02-15 PROCEDURE — 85025 COMPLETE CBC W/AUTO DIFF WBC: CPT | Performed by: NURSE PRACTITIONER

## 2018-02-15 PROCEDURE — 36415 COLL VENOUS BLD VENIPUNCTURE: CPT | Performed by: FAMILY MEDICINE

## 2018-02-20 ENCOUNTER — APPOINTMENT (OUTPATIENT)
Dept: LAB | Facility: HOSPITAL | Age: 73
End: 2018-02-20

## 2018-02-20 ENCOUNTER — OFFICE VISIT (OUTPATIENT)
Dept: ENDOCRINOLOGY | Facility: CLINIC | Age: 73
End: 2018-02-20

## 2018-02-20 VITALS
BODY MASS INDEX: 24.33 KG/M2 | DIASTOLIC BLOOD PRESSURE: 62 MMHG | WEIGHT: 155 LBS | HEART RATE: 60 BPM | HEIGHT: 67 IN | SYSTOLIC BLOOD PRESSURE: 138 MMHG

## 2018-02-20 DIAGNOSIS — Z51.81 ENCOUNTER FOR MEDICATION MONITORING: ICD-10-CM

## 2018-02-20 DIAGNOSIS — E78.49 OTHER HYPERLIPIDEMIA: ICD-10-CM

## 2018-02-20 DIAGNOSIS — I10 ESSENTIAL HYPERTENSION: ICD-10-CM

## 2018-02-20 DIAGNOSIS — E55.9 VITAMIN D DEFICIENCY: ICD-10-CM

## 2018-02-20 DIAGNOSIS — IMO0002 UNCONTROLLED TYPE 2 DIABETES MELLITUS WITH COMPLICATION, WITH LONG-TERM CURRENT USE OF INSULIN: Primary | ICD-10-CM

## 2018-02-20 LAB
ALBUMIN SERPL-MCNC: 4.3 G/DL (ref 3.4–4.8)
ALBUMIN/GLOB SERPL: 1.1 G/DL (ref 1.1–1.8)
ALP SERPL-CCNC: 63 U/L (ref 38–126)
ALT SERPL W P-5'-P-CCNC: 69 U/L (ref 9–52)
ANION GAP SERPL CALCULATED.3IONS-SCNC: 13 MMOL/L (ref 5–15)
AST SERPL-CCNC: 81 U/L (ref 14–36)
BASOPHILS # BLD AUTO: 0.02 10*3/MM3 (ref 0–0.2)
BASOPHILS NFR BLD AUTO: 0.3 % (ref 0–2)
BILIRUB SERPL-MCNC: 0.8 MG/DL (ref 0.2–1.3)
BUN BLD-MCNC: 32 MG/DL (ref 7–21)
BUN/CREAT SERPL: 17.4 (ref 7–25)
CALCIUM SPEC-SCNC: 10 MG/DL (ref 8.4–10.2)
CHLORIDE SERPL-SCNC: 94 MMOL/L (ref 95–110)
CO2 SERPL-SCNC: 29 MMOL/L (ref 22–31)
CREAT BLD-MCNC: 1.84 MG/DL (ref 0.5–1)
DEPRECATED RDW RBC AUTO: 47.3 FL (ref 36.4–46.3)
EOSINOPHIL # BLD AUTO: 0.07 10*3/MM3 (ref 0–0.7)
EOSINOPHIL NFR BLD AUTO: 0.9 % (ref 0–7)
ERYTHROCYTE [DISTWIDTH] IN BLOOD BY AUTOMATED COUNT: 14.6 % (ref 11.5–14.5)
GFR SERPL CREATININE-BSD FRML MDRD: 27 ML/MIN/1.73 (ref 39–90)
GLOBULIN UR ELPH-MCNC: 3.8 GM/DL (ref 2.3–3.5)
GLUCOSE BLD-MCNC: 140 MG/DL (ref 60–100)
HCT VFR BLD AUTO: 41.6 % (ref 35–45)
HGB BLD-MCNC: 13.3 G/DL (ref 12–15.5)
IMM GRANULOCYTES # BLD: 0.02 10*3/MM3 (ref 0–0.02)
IMM GRANULOCYTES NFR BLD: 0.3 % (ref 0–0.5)
LYMPHOCYTES # BLD AUTO: 1.35 10*3/MM3 (ref 0.6–4.2)
LYMPHOCYTES NFR BLD AUTO: 17.4 % (ref 10–50)
MCH RBC QN AUTO: 28.5 PG (ref 26.5–34)
MCHC RBC AUTO-ENTMCNC: 32 G/DL (ref 31.4–36)
MCV RBC AUTO: 89.1 FL (ref 80–98)
MONOCYTES # BLD AUTO: 0.6 10*3/MM3 (ref 0–0.9)
MONOCYTES NFR BLD AUTO: 7.7 % (ref 0–12)
NEUTROPHILS # BLD AUTO: 5.69 10*3/MM3 (ref 2–8.6)
NEUTROPHILS NFR BLD AUTO: 73.4 % (ref 37–80)
PLATELET # BLD AUTO: 282 10*3/MM3 (ref 150–450)
PMV BLD AUTO: 11.2 FL (ref 8–12)
POTASSIUM BLD-SCNC: 5 MMOL/L (ref 3.5–5.1)
PROT SERPL-MCNC: 8.1 G/DL (ref 6.3–8.6)
RBC # BLD AUTO: 4.67 10*6/MM3 (ref 3.77–5.16)
SODIUM BLD-SCNC: 136 MMOL/L (ref 137–145)
T4 FREE SERPL-MCNC: 1.82 NG/DL (ref 0.78–2.19)
TSH SERPL DL<=0.05 MIU/L-ACNC: 3.88 MIU/ML (ref 0.46–4.68)
WBC NRBC COR # BLD: 7.75 10*3/MM3 (ref 3.2–9.8)

## 2018-02-20 PROCEDURE — 84443 ASSAY THYROID STIM HORMONE: CPT | Performed by: NURSE PRACTITIONER

## 2018-02-20 PROCEDURE — 93010 ELECTROCARDIOGRAM REPORT: CPT | Performed by: INTERNAL MEDICINE

## 2018-02-20 PROCEDURE — 80053 COMPREHEN METABOLIC PANEL: CPT | Performed by: NURSE PRACTITIONER

## 2018-02-20 PROCEDURE — 85025 COMPLETE CBC W/AUTO DIFF WBC: CPT | Performed by: NURSE PRACTITIONER

## 2018-02-20 PROCEDURE — 99214 OFFICE O/P EST MOD 30 MIN: CPT | Performed by: NURSE PRACTITIONER

## 2018-02-20 PROCEDURE — 93005 ELECTROCARDIOGRAM TRACING: CPT | Performed by: NURSE PRACTITIONER

## 2018-02-20 PROCEDURE — 84439 ASSAY OF FREE THYROXINE: CPT | Performed by: NURSE PRACTITIONER

## 2018-02-20 PROCEDURE — 36415 COLL VENOUS BLD VENIPUNCTURE: CPT | Performed by: NURSE PRACTITIONER

## 2018-02-20 NOTE — PROGRESS NOTES
Subjective    Jamilah Vee is a 72 y.o. female. she is here today for follow-up.    History of Present Illness        Duration/Timing:  Diabetes mellitus type 2, Age at onset of diabetes: 61 years, Onset of symptoms gradual  timing - constant     quality - not controlled     severity - high     Severity (Complications/Hospitalizations)  Secondary Macrovascular Complications:  CAD   2 stents in May 2014  ICD - defibrillator  Secondary Microvascular Complications:  No Diabetic Nephropathy, No proteinuria, No Diabetic Retinopathy, No Diabetic Neuropathy     Context  Diabetes Regimen:  Insulin, Oral Medications    Lab Results   Component Value Date    HGBA1C 8.3 (H) 02/15/2018             Blood Glucose Readings     This am was 69         Diet  low carb intake  Exercise:  Does not exercise     Associated Signs/Symptoms  Hyperglycemic Symptoms:  No polyuria, No polydipsia, No polyphagia  Hypoglycemic Episodes:  No documented hypoglycemia  Modifying Factors/Comorbidities:  Hypertension, Hyperlipidemia                The following portions of the patient's history were reviewed and updated as appropriate:   Past Medical History:   Diagnosis Date   • Dyslipidemia    • Essential hypertension    • Hypercalcemia    • Osteopenia    • Type II diabetes mellitus, uncontrolled    • Vitamin D deficiency      Past Surgical History:   Procedure Laterality Date   • PACEMAKER IMPLANTATION      Pacemaker AICD pocket (1)       Family History   Problem Relation Age of Onset   • Coronary artery disease Other    • Hypertension Other      OB History     No data available        Current Outpatient Prescriptions   Medication Sig Dispense Refill   • amiodarone (PACERONE) 200 MG tablet Take 200 mg by mouth.     • aspirin 81 MG tablet Take 81 mg by mouth daily.     • atorvastatin (LIPITOR) 40 MG tablet Take 40 mg by mouth daily.     • bumetanide (BUMEX) 1 MG tablet      • clopidogrel (PLAVIX) 75 MG tablet Take 75 mg by mouth daily.     •  insulin aspart (NOVOLOG FLEXPEN) 100 UNIT/ML solution pen-injector sc pen Inject 10-15 Units under the skin 3 (three) times a day before meals.     • Insulin Glargine (LANTUS SOLOSTAR) 100 UNIT/ML injection pen Inject 50 Units under the skin Every Night. 45 mL 5   • Insulin Pen Needle (PEN NEEDLES) 31G X 8 MM misc 4 (four) times a day. Use as indicated 4 times daily.     • losartan (COZAAR) 25 MG tablet Take 25 mg by mouth daily.     • metFORMIN (GLUCOPHAGE) 1000 MG tablet Take 1 tablet by mouth Daily With Breakfast. 30 tablet 5   • metoprolol tartrate (LOPRESSOR) 50 MG tablet Take 50 mg by mouth 2 (two) times a day.     • spironolactone (ALDACTONE) 25 MG tablet Take 25 mg by mouth daily.     • vitamin D (ERGOCALCIFEROL) 12227 UNITS capsule capsule Take 1 capsule by mouth Every 7 (Seven) Days. 5 capsule 5     No current facility-administered medications for this visit.      No Known Allergies  Social History     Social History   • Marital status: Unknown     Spouse name: N/A   • Number of children: N/A   • Years of education: N/A     Social History Main Topics   • Smoking status: Never Smoker   • Smokeless tobacco: Never Used   • Alcohol use No   • Drug use: None   • Sexual activity: Not Asked     Other Topics Concern   • None     Social History Narrative       Review of Systems  Review of Systems   Constitutional: Negative for activity change, appetite change, diaphoresis and fatigue.   HENT: Negative for facial swelling, sneezing, sore throat, tinnitus, trouble swallowing and voice change.    Eyes: Negative for photophobia, pain, discharge, redness, itching and visual disturbance.   Respiratory: Negative for apnea, cough, choking, chest tightness and shortness of breath.    Cardiovascular: Negative for chest pain, palpitations and leg swelling.   Gastrointestinal: Negative for abdominal distention, abdominal pain, constipation, diarrhea, nausea and vomiting.   Endocrine: Negative for cold intolerance, heat  "intolerance, polydipsia, polyphagia and polyuria.   Genitourinary: Negative for difficulty urinating, dysuria, frequency, hematuria and urgency.   Musculoskeletal: Negative for arthralgias, back pain, gait problem, joint swelling, myalgias, neck pain and neck stiffness.   Skin: Negative for color change, pallor, rash and wound.   Neurological: Negative for dizziness, tremors, weakness, light-headedness, numbness and headaches.   Hematological: Negative for adenopathy. Does not bruise/bleed easily.   Psychiatric/Behavioral: Negative for behavioral problems, confusion and sleep disturbance.        Objective    /62 (BP Location: Right arm, Patient Position: Sitting, Cuff Size: Adult)  Pulse 60  Ht 168.9 cm (66.5\")  Wt 70.3 kg (155 lb)  BMI 24.64 kg/m2  Physical Exam   Constitutional: She is oriented to person, place, and time. She appears well-developed and well-nourished. No distress.   HENT:   Head: Normocephalic and atraumatic.   Right Ear: External ear normal.   Left Ear: External ear normal.   Nose: Nose normal.   Eyes: Conjunctivae and EOM are normal. Pupils are equal, round, and reactive to light.   Neck: Normal range of motion. Neck supple. No tracheal deviation present. No thyromegaly present.   Cardiovascular: Normal rate, regular rhythm and normal heart sounds.    No murmur heard.  Pulmonary/Chest: Effort normal and breath sounds normal. No respiratory distress. She has no wheezes.   Abdominal: Soft. Bowel sounds are normal. There is no tenderness. There is no rebound and no guarding.   Musculoskeletal: Normal range of motion. She exhibits no edema, tenderness or deformity.   Neurological: She is alert and oriented to person, place, and time. No cranial nerve deficit.   Skin: Skin is warm and dry. No rash noted.   Psychiatric: She has a normal mood and affect. Her behavior is normal. Judgment and thought content normal.       Lab Review  Glucose (mg/dL)   Date Value   02/15/2018 118 (H) "   11/14/2017 98   03/30/2017 117 (H)     Sodium (mmol/L)   Date Value   02/15/2018 136 (L)   11/14/2017 143   03/30/2017 138     Potassium (mmol/L)   Date Value   02/15/2018 4.6   11/14/2017 4.1   03/30/2017 4.3     Chloride (mmol/L)   Date Value   02/15/2018 95   11/14/2017 95   03/30/2017 95     CO2 (mmol/L)   Date Value   02/15/2018 27.0   11/14/2017 33.0 (H)   03/30/2017 30.0     BUN (mg/dL)   Date Value   02/15/2018 45 (H)   11/14/2017 30 (H)   03/30/2017 21     Creatinine (mg/dL)   Date Value   02/15/2018 1.97 (H)   11/14/2017 1.40 (H)   03/30/2017 1.10 (H)     Hemoglobin A1C (%)   Date Value   02/15/2018 8.3 (H)   11/14/2017 9.8 (H)   03/30/2017 13.83 (H)     Triglycerides   Date Value   11/14/2017 139 mg/dL   10/06/2016 193 mg/dl   06/30/2016 283 mg/dl (H)   08/25/2015 330 mg/dl (H)     LDL Cholesterol    Date Value   11/14/2017 53 mg/dL   10/06/2016 59 mg/dl   06/30/2016 47 mg/dl   08/25/2015 39 mg/dl       Assessment/Plan      1. Uncontrolled type 2 diabetes mellitus with complication, with long-term current use of insulin    2. Essential hypertension    3. Other hyperlipidemia    4. Vitamin D deficiency    5. Encounter for medication monitoring    .    Medications prescribed:  Outpatient Encounter Prescriptions as of 2/20/2018   Medication Sig Dispense Refill   • amiodarone (PACERONE) 200 MG tablet Take 200 mg by mouth.     • aspirin 81 MG tablet Take 81 mg by mouth daily.     • atorvastatin (LIPITOR) 40 MG tablet Take 40 mg by mouth daily.     • bumetanide (BUMEX) 1 MG tablet      • clopidogrel (PLAVIX) 75 MG tablet Take 75 mg by mouth daily.     • insulin aspart (NOVOLOG FLEXPEN) 100 UNIT/ML solution pen-injector sc pen Inject 10-15 Units under the skin 3 (three) times a day before meals.     • Insulin Glargine (LANTUS SOLOSTAR) 100 UNIT/ML injection pen Inject 50 Units under the skin Every Night. 45 mL 5   • Insulin Pen Needle (PEN NEEDLES) 31G X 8 MM misc 4 (four) times a day. Use as indicated 4 times  daily.     • losartan (COZAAR) 25 MG tablet Take 25 mg by mouth daily.     • metFORMIN (GLUCOPHAGE) 1000 MG tablet Take 1 tablet by mouth Daily With Breakfast. 30 tablet 5   • metoprolol tartrate (LOPRESSOR) 50 MG tablet Take 50 mg by mouth 2 (two) times a day.     • spironolactone (ALDACTONE) 25 MG tablet Take 25 mg by mouth daily.     • vitamin D (ERGOCALCIFEROL) 37932 UNITS capsule capsule Take 1 capsule by mouth Every 7 (Seven) Days. 5 capsule 5     No facility-administered encounter medications on file as of 2/20/2018.        Orders placed during this encounter include:  Orders Placed This Encounter   Procedures   • Comprehensive Metabolic Panel   • TSH   • T4, Free   • ECG 12 Lead     Order Specific Question:   Reason for Exam:     Answer:   medication monitoring   • CBC & Differential     Order Specific Question:   Manual Differential     Answer:   No     Glycemic Management      Lab Results   Component Value Date    HGBA1C 8.3 (H) 02/15/2018             Lantus  45 units -- decrease to 40 units         if you wake up with a sugar in the morning  less than 100 -- decrease by 5 units and don't raise it again      --------     Janumet 100/1000 mg with supper----stopped due to expensive      metformin 1000mg one at supper --not taking -- try taking 500 mg once at night -- stop due to GFR         ----------     stop glimepiride since not enough        -----------     Novolog  every meal-----forgets to take --please take with every meal         151-200 : 2 units   201-250 : 4units  251-300 : 6units  above 300 : 7 units     +     3 units per 15 grams of CHO               please cover your nighttime snack         Lipid Management        ldl - 61 feb. 2015     TG - 300     does not want to take any mediations - will try fish oil 1gram two capsules po BID     May 2015     tg - 300     did not start fish oil   refuses prescription medication           Total Cholesterol   Date Value Ref Range Status   11/14/2017 127 0 -  199 mg/dL Final            Triglycerides   Date Value Ref Range Status   11/14/2017 139 20 - 199 mg/dL Final            HDL Cholesterol   Date Value Ref Range Status   11/14/2017 50 (L) 60 - 200 mg/dL Final            LDL Cholesterol    Date Value Ref Range Status   11/14/2017 53 1 - 129 mg/dL Final            Blood Pressure Management  on metoprolol 50 bid  ---Decrease to 1/2 tablet po BID  aldactone 25 mg qd - 1/2 tablet daily   losartan 25 mg qd  furosemide 40 mg qd--- stopped  Bumex 1 mg taking 2 tablets daily      Was in hospital on high doses of diuretics and states kidney function was high but improving         Microvascular Complication Monitoring     eye exam -- les sthan one year     Component      Latest Ref Rng & Units 11/14/2017 11/14/2017       8:42 AM  8:42 AM   Protein/Creatinine Ratio, Urine      0.0 - 200.0 mg/G Crea 48.3     Creatinine, Urine      mg/dL 142.9 142.9   Total Protein, Urine      mg/dL 6.9     Microalbumin/Creatinine Ratio      0.0 - 30.0 mg/g       Microalbumin, Urine      mg/L   <0.6         Bone Health           Oct. 2015     DXA - osteopenia forearm     taking vit d and caclium        Oct. 2016     Vit d  - nl  Calcium - 10.0      Nov. 2017     Vitamin d - 16     Component      Latest Ref Rng & Units 2/15/2018   25 Hydroxy, Vitamin D      30.0 - 100.0 ng/ml 38.3          Other Diabetes Related Aspects                 Lab Results   Component Value Date     [id number] 382 11/14/2017            Lab Results   Component Value Date     TSH 4.680 11/14/2017      On Amiodarone --  Needs EKG for  for medication monitoring      4. Follow-up: Return in about 3 months (around 5/20/2018) for Recheck.

## 2018-02-21 ENCOUNTER — TELEPHONE (OUTPATIENT)
Dept: ENDOCRINOLOGY | Facility: CLINIC | Age: 73
End: 2018-02-21

## 2018-02-21 NOTE — TELEPHONE ENCOUNTER
----- Message from ZAHIRA Sher sent at 2/21/2018 10:53 AM CST -----  Let her know we faxed to her doctor -- stop the metformin based on kidney function

## 2018-05-15 DIAGNOSIS — E55.9 VITAMIN D DEFICIENCY: ICD-10-CM

## 2018-05-15 DIAGNOSIS — E11.9 CONTROLLED TYPE 2 DIABETES MELLITUS WITHOUT COMPLICATION, WITHOUT LONG-TERM CURRENT USE OF INSULIN (HCC): Primary | ICD-10-CM

## 2018-05-15 DIAGNOSIS — E78.49 OTHER HYPERLIPIDEMIA: ICD-10-CM

## 2018-05-15 LAB — HBA1C MFR BLD: 9.4 % (ref 4–5.6)

## 2018-05-15 PROCEDURE — 85025 COMPLETE CBC W/AUTO DIFF WBC: CPT | Performed by: NURSE PRACTITIONER

## 2018-05-15 PROCEDURE — 82306 VITAMIN D 25 HYDROXY: CPT | Performed by: NURSE PRACTITIONER

## 2018-05-15 PROCEDURE — 80061 LIPID PANEL: CPT | Performed by: NURSE PRACTITIONER

## 2018-05-15 PROCEDURE — 80053 COMPREHEN METABOLIC PANEL: CPT | Performed by: NURSE PRACTITIONER

## 2018-05-15 PROCEDURE — 36415 COLL VENOUS BLD VENIPUNCTURE: CPT | Performed by: FAMILY MEDICINE

## 2018-05-15 PROCEDURE — 84443 ASSAY THYROID STIM HORMONE: CPT | Performed by: NURSE PRACTITIONER

## 2018-05-15 PROCEDURE — 83036 HEMOGLOBIN GLYCOSYLATED A1C: CPT | Performed by: NURSE PRACTITIONER

## 2018-05-16 LAB
25(OH)D3 SERPL-MCNC: 32.7 NG/ML (ref 30–100)
ALBUMIN SERPL-MCNC: 3.9 G/DL (ref 3.4–4.8)
ALBUMIN/GLOB SERPL: 1.1 G/DL (ref 1.1–1.8)
ALP SERPL-CCNC: 56 U/L (ref 38–126)
ALT SERPL W P-5'-P-CCNC: 63 U/L (ref 9–52)
ANION GAP SERPL CALCULATED.3IONS-SCNC: 11 MMOL/L (ref 5–15)
ARTICHOKE IGE QN: 58 MG/DL (ref 1–129)
AST SERPL-CCNC: 51 U/L (ref 14–36)
BASOPHILS # BLD AUTO: 0.02 10*3/MM3 (ref 0–0.2)
BASOPHILS NFR BLD AUTO: 0.3 % (ref 0–2)
BILIRUB SERPL-MCNC: 0.9 MG/DL (ref 0.2–1.3)
BUN BLD-MCNC: 32 MG/DL (ref 7–21)
BUN/CREAT SERPL: 23.4 (ref 7–25)
CALCIUM SPEC-SCNC: 9.9 MG/DL (ref 8.4–10.2)
CHLORIDE SERPL-SCNC: 99 MMOL/L (ref 95–110)
CHOLEST SERPL-MCNC: 137 MG/DL (ref 0–199)
CO2 SERPL-SCNC: 29 MMOL/L (ref 22–31)
CREAT BLD-MCNC: 1.37 MG/DL (ref 0.5–1)
DEPRECATED RDW RBC AUTO: 51.2 FL (ref 36.4–46.3)
EOSINOPHIL # BLD AUTO: 0.04 10*3/MM3 (ref 0–0.7)
EOSINOPHIL NFR BLD AUTO: 0.5 % (ref 0–7)
ERYTHROCYTE [DISTWIDTH] IN BLOOD BY AUTOMATED COUNT: 15.3 % (ref 11.5–14.5)
GFR SERPL CREATININE-BSD FRML MDRD: 38 ML/MIN/1.73 (ref 39–90)
GLOBULIN UR ELPH-MCNC: 3.4 GM/DL (ref 2.3–3.5)
GLUCOSE BLD-MCNC: 100 MG/DL (ref 60–100)
HCT VFR BLD AUTO: 44.8 % (ref 35–45)
HDLC SERPL-MCNC: 42 MG/DL (ref 60–200)
HGB BLD-MCNC: 13.8 G/DL (ref 12–15.5)
IMM GRANULOCYTES # BLD: 0.03 10*3/MM3 (ref 0–0.02)
IMM GRANULOCYTES NFR BLD: 0.4 % (ref 0–0.5)
LDLC/HDLC SERPL: 1.41 {RATIO} (ref 0–3.22)
LYMPHOCYTES # BLD AUTO: 0.84 10*3/MM3 (ref 0.6–4.2)
LYMPHOCYTES NFR BLD AUTO: 11.1 % (ref 10–50)
MCH RBC QN AUTO: 28.3 PG (ref 26.5–34)
MCHC RBC AUTO-ENTMCNC: 30.8 G/DL (ref 31.4–36)
MCV RBC AUTO: 91.8 FL (ref 80–98)
MONOCYTES # BLD AUTO: 0.37 10*3/MM3 (ref 0–0.9)
MONOCYTES NFR BLD AUTO: 4.9 % (ref 0–12)
NEUTROPHILS # BLD AUTO: 6.29 10*3/MM3 (ref 2–8.6)
NEUTROPHILS NFR BLD AUTO: 82.8 % (ref 37–80)
PLATELET # BLD AUTO: 222 10*3/MM3 (ref 150–450)
PMV BLD AUTO: 11.7 FL (ref 8–12)
POTASSIUM BLD-SCNC: 4.4 MMOL/L (ref 3.5–5.1)
PROT SERPL-MCNC: 7.3 G/DL (ref 6.3–8.6)
RBC # BLD AUTO: 4.88 10*6/MM3 (ref 3.77–5.16)
SODIUM BLD-SCNC: 139 MMOL/L (ref 137–145)
TRIGL SERPL-MCNC: 179 MG/DL (ref 20–199)
TSH SERPL DL<=0.05 MIU/L-ACNC: 4.44 MIU/ML (ref 0.46–4.68)
WBC NRBC COR # BLD: 7.59 10*3/MM3 (ref 3.2–9.8)

## 2018-05-21 ENCOUNTER — OFFICE VISIT (OUTPATIENT)
Dept: ENDOCRINOLOGY | Facility: CLINIC | Age: 73
End: 2018-05-21

## 2018-05-21 VITALS
HEIGHT: 67 IN | HEART RATE: 60 BPM | SYSTOLIC BLOOD PRESSURE: 128 MMHG | WEIGHT: 156 LBS | BODY MASS INDEX: 24.48 KG/M2 | DIASTOLIC BLOOD PRESSURE: 68 MMHG

## 2018-05-21 DIAGNOSIS — E78.49 OTHER HYPERLIPIDEMIA: ICD-10-CM

## 2018-05-21 DIAGNOSIS — IMO0002 UNCONTROLLED TYPE 2 DIABETES MELLITUS WITH COMPLICATION, WITH LONG-TERM CURRENT USE OF INSULIN: Primary | ICD-10-CM

## 2018-05-21 DIAGNOSIS — I10 ESSENTIAL HYPERTENSION: ICD-10-CM

## 2018-05-21 DIAGNOSIS — E55.9 VITAMIN D DEFICIENCY: ICD-10-CM

## 2018-05-21 PROCEDURE — 99214 OFFICE O/P EST MOD 30 MIN: CPT | Performed by: NURSE PRACTITIONER

## 2018-05-21 NOTE — PROGRESS NOTES
Subjective    Jamilah Vee is a 73 y.o. female. she is here today for follow-up.    History of Present Illness       Duration/Timing:  Diabetes mellitus type 2, Age at onset of diabetes: 61 years, Onset of symptoms gradual  timing - constant     quality - not controlled     severity - high     Severity (Complications/Hospitalizations)  Secondary Macrovascular Complications:  CAD   2 stents in May 2014  ICD - defibrillator  Secondary Microvascular Complications:  No Diabetic Nephropathy, No proteinuria, No Diabetic Retinopathy, No Diabetic Neuropathy     Context  Diabetes Regimen:  Insulin, Oral Medications                                  Lab Results   Component Value Date    HGBA1C 9.4 (H) 05/15/2018          Blood Glucose Readings     This am was 120         Diet  low carb intake  Exercise:  Does not exercise     Associated Signs/Symptoms  Hyperglycemic Symptoms:  No polyuria, No polydipsia, No polyphagia  Hypoglycemic Episodes:  No documented hypoglycemia  Modifying Factors/Comorbidities:  Hypertension, Hyperlipidemia                        The following portions of the patient's history were reviewed and updated as appropriate:   Past Medical History:   Diagnosis Date   • Dyslipidemia    • Essential hypertension    • Hypercalcemia    • Osteopenia    • Type II diabetes mellitus, uncontrolled    • Vitamin D deficiency      Past Surgical History:   Procedure Laterality Date   • PACEMAKER IMPLANTATION      Pacemaker AICD pocket (1)       Family History   Problem Relation Age of Onset   • Coronary artery disease Other    • Hypertension Other      OB History     No data available        Current Outpatient Prescriptions   Medication Sig Dispense Refill   • amiodarone (PACERONE) 200 MG tablet Take 200 mg by mouth.     • aspirin 81 MG tablet Take 81 mg by mouth daily.     • atorvastatin (LIPITOR) 40 MG tablet Take 40 mg by mouth daily.     • bumetanide (BUMEX) 1 MG tablet      • clopidogrel (PLAVIX) 75 MG tablet Take  75 mg by mouth daily.     • insulin aspart (NOVOLOG FLEXPEN) 100 UNIT/ML solution pen-injector sc pen Inject 10-15 Units under the skin 3 (Three) Times a Day Before Meals. 5 pen 11   • Insulin Glargine (LANTUS SOLOSTAR) 100 UNIT/ML injection pen Inject 50 Units under the skin Every Night. 45 mL 5   • Insulin Pen Needle (PEN NEEDLES) 31G X 8 MM misc 4 (four) times a day. Use as indicated 4 times daily.     • losartan (COZAAR) 25 MG tablet Take 25 mg by mouth daily.     • metFORMIN (GLUCOPHAGE) 1000 MG tablet Take 1 tablet by mouth Daily With Breakfast. 30 tablet 5   • metoprolol tartrate (LOPRESSOR) 50 MG tablet Take 50 mg by mouth 2 (two) times a day.     • spironolactone (ALDACTONE) 25 MG tablet Take 25 mg by mouth daily.     • vitamin D (ERGOCALCIFEROL) 92182 UNITS capsule capsule Take 1 capsule by mouth Every 7 (Seven) Days. 5 capsule 5     No current facility-administered medications for this visit.      No Known Allergies  Social History     Social History   • Marital status: Unknown     Social History Main Topics   • Smoking status: Never Smoker   • Smokeless tobacco: Never Used   • Alcohol use No   • Drug use: Unknown     Other Topics Concern   • Not on file       Review of Systems  Review of Systems   Constitutional: Negative for activity change, appetite change, diaphoresis and fatigue.   HENT: Negative for facial swelling, sneezing, sore throat, tinnitus, trouble swallowing and voice change.    Eyes: Negative for photophobia, pain, discharge, redness, itching and visual disturbance.   Respiratory: Negative for apnea, cough, choking, chest tightness and shortness of breath.    Cardiovascular: Negative for chest pain, palpitations and leg swelling.   Gastrointestinal: Negative for abdominal distention, abdominal pain, constipation, diarrhea, nausea and vomiting.   Endocrine: Negative for cold intolerance, heat intolerance, polydipsia, polyphagia and polyuria.   Genitourinary: Negative for difficulty  "urinating, dysuria, frequency, hematuria and urgency.   Musculoskeletal: Negative for arthralgias, back pain, gait problem, joint swelling, myalgias, neck pain and neck stiffness.   Skin: Negative for color change, pallor, rash and wound.   Neurological: Negative for dizziness, tremors, weakness, light-headedness, numbness and headaches.   Hematological: Negative for adenopathy. Does not bruise/bleed easily.   Psychiatric/Behavioral: Negative for behavioral problems, confusion and sleep disturbance.        Objective    /68 (BP Location: Right arm, Patient Position: Sitting, Cuff Size: Adult)   Pulse 60   Ht 168.9 cm (66.5\")   Wt 70.8 kg (156 lb)   BMI 24.80 kg/m²   Physical Exam   Constitutional: She is oriented to person, place, and time. She appears well-developed and well-nourished. No distress.   HENT:   Head: Normocephalic and atraumatic.   Right Ear: External ear normal.   Left Ear: External ear normal.   Nose: Nose normal.   Eyes: Conjunctivae and EOM are normal. Pupils are equal, round, and reactive to light.   Neck: Normal range of motion. Neck supple. No tracheal deviation present. No thyromegaly present.   Cardiovascular: Normal rate, regular rhythm and normal heart sounds.    No murmur heard.  Pulmonary/Chest: Effort normal and breath sounds normal. No respiratory distress. She has no wheezes.   Abdominal: Soft. Bowel sounds are normal. There is no tenderness. There is no rebound and no guarding.   Musculoskeletal: Normal range of motion. She exhibits no edema, tenderness or deformity.    Jamilah had a diabetic foot exam performed today.   During the foot exam she had a monofilament test performed.    Neurological Sensory Findings -  Right ankle/foot altered proprioception and left ankle/foot altered proprioception.  Vascular Status -  Her right foot exhibits no edema. Her left foot exhibits no edema.  Skin Integrity  -  She has right heel is dry and cracked.She has left heel dry and " cracked..  Neurological: She is alert and oriented to person, place, and time. No cranial nerve deficit.   Skin: Skin is warm and dry. No rash noted.   Psychiatric: She has a normal mood and affect. Her behavior is normal. Judgment and thought content normal.       Lab Review  Glucose (mg/dL)   Date Value   05/15/2018 100   02/20/2018 140 (H)   02/15/2018 118 (H)     Sodium (mmol/L)   Date Value   05/15/2018 139   02/20/2018 136 (L)   02/15/2018 136 (L)     Potassium (mmol/L)   Date Value   05/15/2018 4.4   02/20/2018 5.0   02/15/2018 4.6     Chloride (mmol/L)   Date Value   05/15/2018 99   02/20/2018 94 (L)   02/15/2018 95     CO2 (mmol/L)   Date Value   05/15/2018 29.0   02/20/2018 29.0   02/15/2018 27.0     BUN (mg/dL)   Date Value   05/15/2018 32 (H)   02/20/2018 32 (H)   02/15/2018 45 (H)     Creatinine (mg/dL)   Date Value   05/15/2018 1.37 (H)   02/20/2018 1.84 (H)   02/15/2018 1.97 (H)     Hemoglobin A1C (%)   Date Value   05/15/2018 9.4 (H)   02/15/2018 8.3 (H)   11/14/2017 9.8 (H)     Triglycerides   Date Value   05/15/2018 179 mg/dL   11/14/2017 139 mg/dL   10/06/2016 193 mg/dl   06/30/2016 283 mg/dl (H)   08/25/2015 330 mg/dl (H)     LDL Cholesterol    Date Value   05/15/2018 58 mg/dL   11/14/2017 53 mg/dL   10/06/2016 59 mg/dl   06/30/2016 47 mg/dl   08/25/2015 39 mg/dl       Assessment/Plan      1. Uncontrolled type 2 diabetes mellitus with complication, with long-term current use of insulin    2. Essential hypertension    3. Other hyperlipidemia    4. Vitamin D deficiency    .    Medications prescribed:  Outpatient Encounter Prescriptions as of 5/21/2018   Medication Sig Dispense Refill   • amiodarone (PACERONE) 200 MG tablet Take 200 mg by mouth.     • aspirin 81 MG tablet Take 81 mg by mouth daily.     • atorvastatin (LIPITOR) 40 MG tablet Take 40 mg by mouth daily.     • bumetanide (BUMEX) 1 MG tablet      • clopidogrel (PLAVIX) 75 MG tablet Take 75 mg by mouth daily.     • insulin aspart (NOVOLOG  FLEXPEN) 100 UNIT/ML solution pen-injector sc pen Inject 10-15 Units under the skin 3 (Three) Times a Day Before Meals. 5 pen 11   • Insulin Glargine (LANTUS SOLOSTAR) 100 UNIT/ML injection pen Inject 50 Units under the skin Every Night. 45 mL 5   • Insulin Pen Needle (PEN NEEDLES) 31G X 8 MM misc 4 (four) times a day. Use as indicated 4 times daily.     • losartan (COZAAR) 25 MG tablet Take 25 mg by mouth daily.     • metFORMIN (GLUCOPHAGE) 1000 MG tablet Take 1 tablet by mouth Daily With Breakfast. 30 tablet 5   • metoprolol tartrate (LOPRESSOR) 50 MG tablet Take 50 mg by mouth 2 (two) times a day.     • spironolactone (ALDACTONE) 25 MG tablet Take 25 mg by mouth daily.     • vitamin D (ERGOCALCIFEROL) 07199 UNITS capsule capsule Take 1 capsule by mouth Every 7 (Seven) Days. 5 capsule 5   • [DISCONTINUED] insulin aspart (NOVOLOG FLEXPEN) 100 UNIT/ML solution pen-injector sc pen Inject 10-15 Units under the skin 3 (three) times a day before meals.       No facility-administered encounter medications on file as of 5/21/2018.        Orders placed during this encounter include:  Orders Placed This Encounter   Procedures   • Comprehensive Metabolic Panel   • Hemoglobin A1c   • Vitamin D 25 Hydroxy   • Vitamin B12   • CBC & Differential     Order Specific Question:   Manual Differential     Answer:   No     Glycemic Management       Lab Results   Component Value Date    HGBA1C 9.4 (H) 05/15/2018                   Lantus  45 units -- decrease to 40 units --        if you wake up with a sugar in the morning  less than 100 -- decrease by 5 units and don't raise it again      --------     Janumet 100/1000 mg with supper----stopped due to expensive      metformin 1000mg one at supper --not taking -- try taking 500 mg once at night -- stop due to GFR         ----------     stop glimepiride since not enough        -----------     Novolog  every meal-----does not give every time --- please give each meal        151-200 : 2  units   201-250 : 4units  251-300 : 6units  above 300 : 7 units     +     3 units per 15 grams of CHO               please cover your nighttime snack         Lipid Management        ldl - 61 feb. 2015     TG - 300     does not want to take any mediations - will try fish oil 1gram two capsules po BID     May 2015     tg - 300     did not start fish oil   refuses prescription medication     Component      Latest Ref Rng & Units 5/15/2018   Total Cholesterol      0 - 199 mg/dL 137   Triglycerides      20 - 199 mg/dL 179   HDL Cholesterol      60 - 200 mg/dL 42 (L)   LDL Cholesterol       1 - 129 mg/dL 58   LDL/HDL Ratio      0.00 - 3.22 1.41              Blood Pressure Management        on metoprolol 50 bid  ---Decrease to 1/2 tablet po BID  aldactone 25 mg qd - 1/2 tablet daily   losartan 25 mg qd  furosemide 40 mg qd--- stopped  Bumex 1 mg taking 2 tablets daily      Was in hospital on high doses of diuretics and states kidney function was high but improving         Microvascular Complication Monitoring     eye exam -- les sthan one year     Component      Latest Ref Rng & Units 11/14/2017 11/14/2017      8:42 AM  8:42 AM   Protein/Creatinine Ratio, Urine      0.0 - 200.0 mg/G Crea 48.3     Creatinine, Urine      mg/dL 142.9 142.9   Total Protein, Urine      mg/dL 6.9     Microalbumin/Creatinine Ratio      0.0 - 30.0 mg/g       Microalbumin, Urine      mg/L   <0.6         Bone Health           Oct. 2015     DXA - osteopenia forearm     taking vit d and caclium        Oct. 2016     Vit d  - nl  Calcium - 10.0                 Component      Latest Ref Rng & Units 5/15/2018   25 Hydroxy, Vitamin D      30.0 - 100.0 ng/ml 32.7     Other Diabetes Related Aspects                     Lab Results   Component Value Date     [id number] 382 11/14/2017           Lab Results   Component Value Date    TSH 4.440 05/15/2018         4. Follow-up: Return in about 3 months (around 8/21/2018) for Recheck.

## 2018-08-16 LAB
25(OH)D3 SERPL-MCNC: 38.3 NG/ML (ref 30–100)
ALBUMIN SERPL-MCNC: 4 G/DL (ref 3.4–4.8)
ALBUMIN/GLOB SERPL: 1.2 G/DL (ref 1.1–1.8)
ALP SERPL-CCNC: 53 U/L (ref 38–126)
ALT SERPL W P-5'-P-CCNC: 48 U/L (ref 9–52)
ANION GAP SERPL CALCULATED.3IONS-SCNC: 11 MMOL/L (ref 5–15)
AST SERPL-CCNC: 44 U/L (ref 14–36)
BASOPHILS # BLD AUTO: 0.04 10*3/MM3 (ref 0–0.2)
BASOPHILS NFR BLD AUTO: 0.6 % (ref 0–2)
BILIRUB SERPL-MCNC: 1.3 MG/DL (ref 0.2–1.3)
BUN BLD-MCNC: 30 MG/DL (ref 7–21)
BUN/CREAT SERPL: 19.6 (ref 7–25)
CALCIUM SPEC-SCNC: 9.5 MG/DL (ref 8.4–10.2)
CHLORIDE SERPL-SCNC: 98 MMOL/L (ref 95–110)
CO2 SERPL-SCNC: 29 MMOL/L (ref 22–31)
CREAT BLD-MCNC: 1.53 MG/DL (ref 0.5–1)
DEPRECATED RDW RBC AUTO: 50.7 FL (ref 36.4–46.3)
EOSINOPHIL # BLD AUTO: 0.26 10*3/MM3 (ref 0–0.7)
EOSINOPHIL NFR BLD AUTO: 3.9 % (ref 0–7)
ERYTHROCYTE [DISTWIDTH] IN BLOOD BY AUTOMATED COUNT: 15.3 % (ref 11.5–14.5)
GFR SERPL CREATININE-BSD FRML MDRD: 33 ML/MIN/1.73 (ref 39–90)
GLOBULIN UR ELPH-MCNC: 3.4 GM/DL (ref 2.3–3.5)
GLUCOSE BLD-MCNC: 144 MG/DL (ref 60–100)
HBA1C MFR BLD: 9.4 % (ref 4–5.6)
HCT VFR BLD AUTO: 42.6 % (ref 35–45)
HGB BLD-MCNC: 13.5 G/DL (ref 12–15.5)
IMM GRANULOCYTES # BLD: 0.01 10*3/MM3 (ref 0–0.02)
IMM GRANULOCYTES NFR BLD: 0.2 % (ref 0–0.5)
LYMPHOCYTES # BLD AUTO: 0.71 10*3/MM3 (ref 0.6–4.2)
LYMPHOCYTES NFR BLD AUTO: 10.7 % (ref 10–50)
MCH RBC QN AUTO: 28.4 PG (ref 26.5–34)
MCHC RBC AUTO-ENTMCNC: 31.7 G/DL (ref 31.4–36)
MCV RBC AUTO: 89.7 FL (ref 80–98)
MONOCYTES # BLD AUTO: 0.8 10*3/MM3 (ref 0–0.9)
MONOCYTES NFR BLD AUTO: 12 % (ref 0–12)
NEUTROPHILS # BLD AUTO: 4.83 10*3/MM3 (ref 2–8.6)
NEUTROPHILS NFR BLD AUTO: 72.6 % (ref 37–80)
PLATELET # BLD AUTO: 215 10*3/MM3 (ref 150–450)
PMV BLD AUTO: 11.4 FL (ref 8–12)
POTASSIUM BLD-SCNC: 5.1 MMOL/L (ref 3.5–5.1)
PROT SERPL-MCNC: 7.4 G/DL (ref 6.3–8.6)
RBC # BLD AUTO: 4.75 10*6/MM3 (ref 3.77–5.16)
SODIUM BLD-SCNC: 138 MMOL/L (ref 137–145)
VIT B12 BLD-MCNC: 429 PG/ML (ref 239–931)
WBC NRBC COR # BLD: 6.65 10*3/MM3 (ref 3.2–9.8)

## 2018-08-16 PROCEDURE — 82607 VITAMIN B-12: CPT | Performed by: NURSE PRACTITIONER

## 2018-08-16 PROCEDURE — 84443 ASSAY THYROID STIM HORMONE: CPT | Performed by: NURSE PRACTITIONER

## 2018-08-16 PROCEDURE — 80053 COMPREHEN METABOLIC PANEL: CPT | Performed by: NURSE PRACTITIONER

## 2018-08-16 PROCEDURE — 85025 COMPLETE CBC W/AUTO DIFF WBC: CPT | Performed by: NURSE PRACTITIONER

## 2018-08-16 PROCEDURE — 83036 HEMOGLOBIN GLYCOSYLATED A1C: CPT | Performed by: NURSE PRACTITIONER

## 2018-08-16 PROCEDURE — 36415 COLL VENOUS BLD VENIPUNCTURE: CPT | Performed by: FAMILY MEDICINE

## 2018-08-16 PROCEDURE — 82306 VITAMIN D 25 HYDROXY: CPT | Performed by: NURSE PRACTITIONER

## 2018-08-21 ENCOUNTER — OFFICE VISIT (OUTPATIENT)
Dept: ENDOCRINOLOGY | Facility: CLINIC | Age: 73
End: 2018-08-21

## 2018-08-21 VITALS
HEIGHT: 67 IN | WEIGHT: 156 LBS | SYSTOLIC BLOOD PRESSURE: 120 MMHG | BODY MASS INDEX: 24.48 KG/M2 | DIASTOLIC BLOOD PRESSURE: 70 MMHG | HEART RATE: 59 BPM

## 2018-08-21 DIAGNOSIS — E78.49 OTHER HYPERLIPIDEMIA: ICD-10-CM

## 2018-08-21 DIAGNOSIS — IMO0002 UNCONTROLLED TYPE 2 DIABETES MELLITUS WITH COMPLICATION, WITH LONG-TERM CURRENT USE OF INSULIN: Primary | ICD-10-CM

## 2018-08-21 DIAGNOSIS — E55.9 VITAMIN D DEFICIENCY: ICD-10-CM

## 2018-08-21 DIAGNOSIS — R94.6 ABNORMAL THYROID FUNCTION TEST: ICD-10-CM

## 2018-08-21 DIAGNOSIS — I10 ESSENTIAL HYPERTENSION: ICD-10-CM

## 2018-08-21 LAB — TSH SERPL DL<=0.05 MIU/L-ACNC: 5.6 MIU/ML (ref 0.46–4.68)

## 2018-08-21 PROCEDURE — 99214 OFFICE O/P EST MOD 30 MIN: CPT | Performed by: NURSE PRACTITIONER

## 2018-08-21 NOTE — PROGRESS NOTES
Subjective    Jamilah Vee is a 73 y.o. female. she is here today for follow-up.    History of Present Illness       Duration/Timing:  Diabetes mellitus type 2, Age at onset of diabetes: 61 years, Onset of symptoms gradual  timing - constant     quality - not controlled     severity - high     Severity (Complications/Hospitalizations)  Secondary Macrovascular Complications:  CAD   2 stents in May 2014  ICD - defibrillator  Secondary Microvascular Complications:  No Diabetic Nephropathy, No proteinuria, No Diabetic Retinopathy, No Diabetic Neuropathy     Context  Diabetes Regimen:  Insulin, Oral Medications                 Lab Results   Component Value Date    HGBA1C 9.4 (H) 08/16/2018              Blood Glucose Readings     This am was 120         Diet  low carb intake  Exercise:  Does not exercise     Associated Signs/Symptoms  Hyperglycemic Symptoms:  No polyuria, No polydipsia, No polyphagia  Hypoglycemic Episodes:  No documented hypoglycemia  Modifying Factors/Comorbidities:  Hypertension, Hyperlipidemia                      The following portions of the patient's history were reviewed and updated as appropriate:   Past Medical History:   Diagnosis Date   • Dyslipidemia    • Essential hypertension    • Hypercalcemia    • Osteopenia    • Type II diabetes mellitus, uncontrolled (CMS/[facility])    • Vitamin D deficiency      Past Surgical History:   Procedure Laterality Date   • PACEMAKER IMPLANTATION      Pacemaker AICD pocket (1)       Family History   Problem Relation Age of Onset   • Coronary artery disease Other    • Hypertension Other      OB History     No data available        Current Outpatient Prescriptions   Medication Sig Dispense Refill   • amiodarone (PACERONE) 200 MG tablet Take 200 mg by mouth.     • aspirin 81 MG tablet Take 81 mg by mouth daily.     • atorvastatin (LIPITOR) 40 MG tablet Take 40 mg by mouth daily.     • bumetanide (BUMEX) 1 MG tablet      • clopidogrel (PLAVIX) 75 MG tablet Take 75  mg by mouth daily.     • insulin aspart (NOVOLOG FLEXPEN) 100 UNIT/ML solution pen-injector sc pen Inject 10-15 Units under the skin 3 (Three) Times a Day Before Meals. 5 pen 11   • Insulin Glargine (LANTUS SOLOSTAR) 100 UNIT/ML injection pen Inject 50 Units under the skin Every Night. 45 mL 5   • Insulin Pen Needle (PEN NEEDLES) 31G X 8 MM misc 4 (four) times a day. Use as indicated 4 times daily.     • losartan (COZAAR) 25 MG tablet Take 25 mg by mouth daily.     • metFORMIN (GLUCOPHAGE) 1000 MG tablet Take 1 tablet by mouth Daily With Breakfast. 30 tablet 5   • metoprolol tartrate (LOPRESSOR) 50 MG tablet Take 50 mg by mouth 2 (two) times a day.     • spironolactone (ALDACTONE) 25 MG tablet Take 25 mg by mouth daily.     • vitamin D (ERGOCALCIFEROL) 84656 UNITS capsule capsule Take 1 capsule by mouth Every 7 (Seven) Days. 5 capsule 5     No current facility-administered medications for this visit.      No Known Allergies  Social History     Social History   • Marital status: Unknown     Social History Main Topics   • Smoking status: Never Smoker   • Smokeless tobacco: Never Used   • Alcohol use No   • Drug use: Unknown     Other Topics Concern   • Not on file       Review of Systems  Review of Systems   Constitutional: Negative for activity change, appetite change, diaphoresis and fatigue.   HENT: Negative for facial swelling, sneezing, sore throat, tinnitus, trouble swallowing and voice change.    Eyes: Negative for photophobia, pain, discharge, redness, itching and visual disturbance.   Respiratory: Negative for apnea, cough, choking, chest tightness and shortness of breath.    Cardiovascular: Negative for chest pain, palpitations and leg swelling.   Gastrointestinal: Negative for abdominal distention, abdominal pain, constipation, diarrhea, nausea and vomiting.   Endocrine: Negative for cold intolerance, heat intolerance, polydipsia, polyphagia and polyuria.   Genitourinary: Negative for difficulty urinating,  "dysuria, frequency, hematuria and urgency.   Musculoskeletal: Negative for arthralgias, back pain, gait problem, joint swelling, myalgias, neck pain and neck stiffness.   Skin: Negative for color change, pallor, rash and wound.   Neurological: Negative for dizziness, tremors, weakness, light-headedness, numbness and headaches.   Hematological: Negative for adenopathy. Does not bruise/bleed easily.   Psychiatric/Behavioral: Negative for behavioral problems, confusion and sleep disturbance.        Objective    /70 (BP Location: Left arm, Patient Position: Sitting, Cuff Size: Adult)   Pulse 59   Ht 168.9 cm (66.5\")   Wt 70.8 kg (156 lb)   BMI 24.80 kg/m²   Physical Exam   Constitutional: She is oriented to person, place, and time. She appears well-developed and well-nourished. No distress.   HENT:   Head: Normocephalic and atraumatic.   Right Ear: External ear normal.   Left Ear: External ear normal.   Nose: Nose normal.   Eyes: Pupils are equal, round, and reactive to light. Conjunctivae and EOM are normal.   Neck: Normal range of motion. Neck supple. No tracheal deviation present. No thyromegaly present.   Cardiovascular: Normal rate, regular rhythm and normal heart sounds.    No murmur heard.  Pulmonary/Chest: Effort normal and breath sounds normal. No respiratory distress. She has no wheezes.   Abdominal: Soft. Bowel sounds are normal. There is no tenderness. There is no rebound and no guarding.   Musculoskeletal: Normal range of motion. She exhibits no edema, tenderness or deformity.   Neurological: She is alert and oriented to person, place, and time. No cranial nerve deficit.   Skin: Skin is warm and dry. No rash noted.   Psychiatric: She has a normal mood and affect. Her behavior is normal. Judgment and thought content normal.       Lab Review  Glucose (mg/dL)   Date Value   08/16/2018 144 (H)   05/15/2018 100   02/20/2018 140 (H)     Sodium (mmol/L)   Date Value   08/16/2018 138   05/15/2018 139 "   02/20/2018 136 (L)     Potassium (mmol/L)   Date Value   08/16/2018 5.1   05/15/2018 4.4   02/20/2018 5.0     Chloride (mmol/L)   Date Value   08/16/2018 98   05/15/2018 99   02/20/2018 94 (L)     CO2 (mmol/L)   Date Value   08/16/2018 29.0   05/15/2018 29.0   02/20/2018 29.0     BUN (mg/dL)   Date Value   08/16/2018 30 (H)   05/15/2018 32 (H)   02/20/2018 32 (H)     Creatinine (mg/dL)   Date Value   08/16/2018 1.53 (H)   05/15/2018 1.37 (H)   02/20/2018 1.84 (H)     Hemoglobin A1C (%)   Date Value   08/16/2018 9.4 (H)   05/15/2018 9.4 (H)   02/15/2018 8.3 (H)     Triglycerides   Date Value   05/15/2018 179 mg/dL   11/14/2017 139 mg/dL   10/06/2016 193 mg/dl   06/30/2016 283 mg/dl (H)   08/25/2015 330 mg/dl (H)     LDL Cholesterol    Date Value   05/15/2018 58 mg/dL   11/14/2017 53 mg/dL   10/06/2016 59 mg/dl   06/30/2016 47 mg/dl   08/25/2015 39 mg/dl       Assessment/Plan      1. Uncontrolled type 2 diabetes mellitus with complication, with long-term current use of insulin (CMS/[facility])    2. Essential hypertension    3. Other hyperlipidemia    4. Vitamin D deficiency    5. Abnormal thyroid function test    .    Medications prescribed:  Outpatient Encounter Prescriptions as of 8/21/2018   Medication Sig Dispense Refill   • amiodarone (PACERONE) 200 MG tablet Take 200 mg by mouth.     • aspirin 81 MG tablet Take 81 mg by mouth daily.     • atorvastatin (LIPITOR) 40 MG tablet Take 40 mg by mouth daily.     • bumetanide (BUMEX) 1 MG tablet      • clopidogrel (PLAVIX) 75 MG tablet Take 75 mg by mouth daily.     • insulin aspart (NOVOLOG FLEXPEN) 100 UNIT/ML solution pen-injector sc pen Inject 10-15 Units under the skin 3 (Three) Times a Day Before Meals. 5 pen 11   • Insulin Glargine (LANTUS SOLOSTAR) 100 UNIT/ML injection pen Inject 50 Units under the skin Every Night. 45 mL 5   • Insulin Pen Needle (PEN NEEDLES) 31G X 8 MM misc 4 (four) times a day. Use as indicated 4 times daily.     • losartan (COZAAR) 25 MG tablet  Take 25 mg by mouth daily.     • metFORMIN (GLUCOPHAGE) 1000 MG tablet Take 1 tablet by mouth Daily With Breakfast. 30 tablet 5   • metoprolol tartrate (LOPRESSOR) 50 MG tablet Take 50 mg by mouth 2 (two) times a day.     • spironolactone (ALDACTONE) 25 MG tablet Take 25 mg by mouth daily.     • vitamin D (ERGOCALCIFEROL) 18722 UNITS capsule capsule Take 1 capsule by mouth Every 7 (Seven) Days. 5 capsule 5     No facility-administered encounter medications on file as of 8/21/2018.        Orders placed during this encounter include:  Orders Placed This Encounter   Procedures   • TSH   • Comprehensive Metabolic Panel   • Hemoglobin A1c   • Lipid Panel   • Vitamin D 25 Hydroxy   • Vitamin B12   • TSH   • CBC & Differential     Order Specific Question:   Manual Differential     Answer:   No     Glycemic Management      Lab Results   Component Value Date    HGBA1C 9.4 (H) 08/16/2018                  Lantus   40 units        if you wake up with a sugar in the morning  less than 100 -- decrease by 5 units and don't raise it again      --------     Janumet 100/1000 mg with supper----stopped due to expensive      metformin 1000mg one at supper --not taking -- try taking 500 mg once at night -- stop due to GFR         ----------     stop glimepiride since not enough        -----------     Novolog  every meal--not giving --      Please give for your meals      151-200 : 2 units   201-250 : 4units  251-300 : 6units  above 300 : 7 units     +     3 units per 15 grams of CHO               please cover your nighttime snack         Lipid Management        ldl - 61 feb. 2015     TG - 300     does not want to take any mediations - will try fish oil 1gram two capsules po BID     May 2015     tg - 300     did not start fish oil   refuses prescription medication     Component      Latest Ref Rng & Units 5/15/2018   Total Cholesterol      0 - 199 mg/dL 137   Triglycerides      20 - 199 mg/dL 179   HDL Cholesterol      60 - 200 mg/dL 42  (L)   LDL Cholesterol       1 - 129 mg/dL 58   LDL/HDL Ratio      0.00 - 3.22 1.41               Blood Pressure Management           on metoprolol 50 bid  ---Decrease to 1/2 tablet po BID  aldactone 25 mg qd - 1/2 tablet daily   losartan 25 mg qd  furosemide 40 mg qd--- stopped  Bumex 1 mg taking 2 tablets daily      Was in hospital on high doses of diuretics and states kidney function was high but improving         Microvascular Complication Monitoring     eye exam -- les sthan one year     Component      Latest Ref Rng & Units 11/14/2017 11/14/2017      8:42 AM  8:42 AM   Protein/Creatinine Ratio, Urine      0.0 - 200.0 mg/G Crea 48.3     Creatinine, Urine      mg/dL 142.9 142.9   Total Protein, Urine      mg/dL 6.9     Microalbumin/Creatinine Ratio      0.0 - 30.0 mg/g       Microalbumin, Urine      mg/L   <0.6         Bone Health             Component      Latest Ref Rng & Units 8/16/2018   25 Hydroxy, Vitamin D      30.0 - 100.0 ng/ml 38.3          Other Diabetes Related Aspects        Lab Results   Component Value Date    TSH 4.440 05/15/2018          Lab Results   Component Value Date    [id number] 429 08/16/2018             4. Follow-up: Return in about 3 months (around 11/21/2018) for Recheck.

## 2018-08-22 ENCOUNTER — TELEPHONE (OUTPATIENT)
Dept: ENDOCRINOLOGY | Facility: CLINIC | Age: 73
End: 2018-08-22

## 2018-08-22 DIAGNOSIS — R94.6 ABNORMAL THYROID FUNCTION TEST: Primary | ICD-10-CM

## 2018-08-22 NOTE — TELEPHONE ENCOUNTER
----- Message from ZAHIRA Sher sent at 8/22/2018  8:43 AM CDT -----  Thyroid level is mildly underactive this could be stress related or the amiodarone-- we need to repeat thyroid labs in 4 weeks she can go to [place] I will put in order

## 2018-11-16 ENCOUNTER — APPOINTMENT (OUTPATIENT)
Dept: LAB | Facility: HOSPITAL | Age: 73
End: 2018-11-16

## 2018-11-16 LAB
25(OH)D3 SERPL-MCNC: 29.6 NG/ML (ref 30–100)
ALBUMIN SERPL-MCNC: 4.5 G/DL (ref 3.4–4.8)
ALBUMIN/GLOB SERPL: 1.4 G/DL (ref 1.1–1.8)
ALP SERPL-CCNC: 58 U/L (ref 38–126)
ALT SERPL W P-5'-P-CCNC: 46 U/L (ref 9–52)
ANION GAP SERPL CALCULATED.3IONS-SCNC: 14 MMOL/L (ref 5–15)
ARTICHOKE IGE QN: 62 MG/DL (ref 1–129)
AST SERPL-CCNC: 41 U/L (ref 14–36)
BASOPHILS # BLD AUTO: 0.02 10*3/MM3 (ref 0–0.2)
BASOPHILS NFR BLD AUTO: 0.3 % (ref 0–2)
BILIRUB SERPL-MCNC: 1.2 MG/DL (ref 0.2–1.3)
BUN BLD-MCNC: 25 MG/DL (ref 7–21)
BUN/CREAT SERPL: 15.9 (ref 7–25)
CALCIUM SPEC-SCNC: 10.2 MG/DL (ref 8.4–10.2)
CHLORIDE SERPL-SCNC: 96 MMOL/L (ref 95–110)
CHOLEST SERPL-MCNC: 140 MG/DL (ref 0–199)
CO2 SERPL-SCNC: 30 MMOL/L (ref 22–31)
CREAT BLD-MCNC: 1.57 MG/DL (ref 0.5–1)
DEPRECATED RDW RBC AUTO: 48.1 FL (ref 36.4–46.3)
EOSINOPHIL # BLD AUTO: 0.07 10*3/MM3 (ref 0–0.7)
EOSINOPHIL NFR BLD AUTO: 1 % (ref 0–7)
ERYTHROCYTE [DISTWIDTH] IN BLOOD BY AUTOMATED COUNT: 14.8 % (ref 11.5–14.5)
GFR SERPL CREATININE-BSD FRML MDRD: 32 ML/MIN/1.73 (ref 39–90)
GLOBULIN UR ELPH-MCNC: 3.2 GM/DL (ref 2.3–3.5)
GLUCOSE BLD-MCNC: 125 MG/DL (ref 60–100)
HBA1C MFR BLD: 9.9 % (ref 4–5.6)
HCT VFR BLD AUTO: 43.6 % (ref 35–45)
HDLC SERPL-MCNC: 52 MG/DL (ref 60–200)
HGB BLD-MCNC: 14.2 G/DL (ref 12–15.5)
IMM GRANULOCYTES # BLD: 0.01 10*3/MM3 (ref 0–0.02)
IMM GRANULOCYTES NFR BLD: 0.1 % (ref 0–0.5)
LDLC/HDLC SERPL: 0.98 {RATIO} (ref 0–3.22)
LYMPHOCYTES # BLD AUTO: 0.74 10*3/MM3 (ref 0.6–4.2)
LYMPHOCYTES NFR BLD AUTO: 10.7 % (ref 10–50)
MCH RBC QN AUTO: 29.2 PG (ref 26.5–34)
MCHC RBC AUTO-ENTMCNC: 32.6 G/DL (ref 31.4–36)
MCV RBC AUTO: 89.5 FL (ref 80–98)
MONOCYTES # BLD AUTO: 0.61 10*3/MM3 (ref 0–0.9)
MONOCYTES NFR BLD AUTO: 8.8 % (ref 0–12)
NEUTROPHILS # BLD AUTO: 5.47 10*3/MM3 (ref 2–8.6)
NEUTROPHILS NFR BLD AUTO: 79.1 % (ref 37–80)
PLATELET # BLD AUTO: 182 10*3/MM3 (ref 150–450)
PMV BLD AUTO: 11.7 FL (ref 8–12)
POTASSIUM BLD-SCNC: 4.6 MMOL/L (ref 3.5–5.1)
PROT SERPL-MCNC: 7.7 G/DL (ref 6.3–8.6)
RBC # BLD AUTO: 4.87 10*6/MM3 (ref 3.77–5.16)
SODIUM BLD-SCNC: 140 MMOL/L (ref 137–145)
T3 SERPL-MCNC: 117 NG/DL (ref 97–169)
T4 FREE SERPL-MCNC: 1.88 NG/DL (ref 0.78–2.19)
TRIGL SERPL-MCNC: 185 MG/DL (ref 20–199)
TSH SERPL DL<=0.05 MIU/L-ACNC: 7 MIU/ML (ref 0.46–4.68)
VIT B12 BLD-MCNC: 382 PG/ML (ref 239–931)
WBC NRBC COR # BLD: 6.92 10*3/MM3 (ref 3.2–9.8)

## 2018-11-16 PROCEDURE — 85025 COMPLETE CBC W/AUTO DIFF WBC: CPT | Performed by: NURSE PRACTITIONER

## 2018-11-16 PROCEDURE — 83036 HEMOGLOBIN GLYCOSYLATED A1C: CPT | Performed by: NURSE PRACTITIONER

## 2018-11-16 PROCEDURE — 86376 MICROSOMAL ANTIBODY EACH: CPT | Performed by: NURSE PRACTITIONER

## 2018-11-16 PROCEDURE — 80053 COMPREHEN METABOLIC PANEL: CPT | Performed by: NURSE PRACTITIONER

## 2018-11-16 PROCEDURE — 84480 ASSAY TRIIODOTHYRONINE (T3): CPT | Performed by: NURSE PRACTITIONER

## 2018-11-16 PROCEDURE — 84445 ASSAY OF TSI GLOBULIN: CPT | Performed by: NURSE PRACTITIONER

## 2018-11-16 PROCEDURE — 84443 ASSAY THYROID STIM HORMONE: CPT | Performed by: NURSE PRACTITIONER

## 2018-11-16 PROCEDURE — 82607 VITAMIN B-12: CPT | Performed by: NURSE PRACTITIONER

## 2018-11-16 PROCEDURE — 80061 LIPID PANEL: CPT | Performed by: NURSE PRACTITIONER

## 2018-11-16 PROCEDURE — 82306 VITAMIN D 25 HYDROXY: CPT | Performed by: NURSE PRACTITIONER

## 2018-11-16 PROCEDURE — 84439 ASSAY OF FREE THYROXINE: CPT | Performed by: NURSE PRACTITIONER

## 2018-11-16 PROCEDURE — 83520 IMMUNOASSAY QUANT NOS NONAB: CPT | Performed by: NURSE PRACTITIONER

## 2018-11-17 LAB
THYROPEROXIDASE AB SERPL-ACNC: 12 IU/ML (ref 0–34)
TSH RECEP AB SER-ACNC: <0.5 IU/L (ref 0–1.75)

## 2018-11-19 LAB — TSI SER-MCNC: <0.1 IU/L (ref 0–0.55)

## 2018-11-26 ENCOUNTER — OFFICE VISIT (OUTPATIENT)
Dept: ENDOCRINOLOGY | Facility: CLINIC | Age: 73
End: 2018-11-26

## 2018-11-26 VITALS
HEART RATE: 64 BPM | HEIGHT: 67 IN | BODY MASS INDEX: 25.74 KG/M2 | SYSTOLIC BLOOD PRESSURE: 136 MMHG | WEIGHT: 164 LBS | DIASTOLIC BLOOD PRESSURE: 70 MMHG

## 2018-11-26 DIAGNOSIS — Z91.199 NONCOMPLIANCE WITH DIABETES TREATMENT: ICD-10-CM

## 2018-11-26 DIAGNOSIS — E03.2 HYPOTHYROIDISM DUE TO MEDICATION: ICD-10-CM

## 2018-11-26 DIAGNOSIS — IMO0002 UNCONTROLLED TYPE 2 DIABETES MELLITUS WITH COMPLICATION, WITH LONG-TERM CURRENT USE OF INSULIN: Primary | ICD-10-CM

## 2018-11-26 DIAGNOSIS — E78.49 OTHER HYPERLIPIDEMIA: ICD-10-CM

## 2018-11-26 DIAGNOSIS — I10 ESSENTIAL HYPERTENSION: ICD-10-CM

## 2018-11-26 DIAGNOSIS — E55.9 VITAMIN D DEFICIENCY: ICD-10-CM

## 2018-11-26 PROCEDURE — 99214 OFFICE O/P EST MOD 30 MIN: CPT | Performed by: NURSE PRACTITIONER

## 2018-11-26 RX ORDER — LEVOTHYROXINE SODIUM 0.03 MG/1
25 TABLET ORAL DAILY
Qty: 30 TABLET | Refills: 11 | Status: SHIPPED | OUTPATIENT
Start: 2018-11-26 | End: 2019-06-20 | Stop reason: SDUPTHER

## 2018-11-26 NOTE — PROGRESS NOTES
Subjective    Jamilah Vee is a 73 y.o. female. she is here today for follow-up.    History of Present Illness         Duration/Timing:  Diabetes mellitus type 2, Age at onset of diabetes: 61 years, Onset of symptoms gradual  timing - constant     quality - not controlled     severity - high     Severity (Complications/Hospitalizations)  Secondary Macrovascular Complications:  CAD   2 stents in May 2014  ICD - defibrillator  Secondary Microvascular Complications:  No Diabetic Nephropathy, No proteinuria, No Diabetic Retinopathy, No Diabetic Neuropathy     Context  Diabetes Regimen:  Insulin, Oral Medications        Lab Results   Component Value Date    HGBA1C 9.9 (H) 11/16/2018                   Blood Glucose Readings     This am was 120     Not taking mealtime         Diet  low carb intake  Exercise:  Does not exercise     Associated Signs/Symptoms  Hyperglycemic Symptoms:  No polyuria, No polydipsia, No polyphagia  Hypoglycemic Episodes:  No documented hypoglycemia  Modifying Factors/Comorbidities:  Hypertension, Hyperlipidemia                          The following portions of the patient's history were reviewed and updated as appropriate:   Past Medical History:   Diagnosis Date   • Dyslipidemia    • Essential hypertension    • Hypercalcemia    • Osteopenia    • Type II diabetes mellitus, uncontrolled (CMS/[facility])    • Vitamin D deficiency      Past Surgical History:   Procedure Laterality Date   • PACEMAKER IMPLANTATION      Pacemaker AICD pocket (1)       Family History   Problem Relation Age of Onset   • Coronary artery disease Other    • Hypertension Other      OB History     No data available        Current Outpatient Medications   Medication Sig Dispense Refill   • aspirin 81 MG tablet Take 81 mg by mouth daily.     • atorvastatin (LIPITOR) 40 MG tablet Take 40 mg by mouth daily.     • bumetanide (BUMEX) 1 MG tablet      • clopidogrel (PLAVIX) 75 MG tablet Take 75 mg by mouth daily.     • insulin aspart  (NOVOLOG FLEXPEN) 100 UNIT/ML solution pen-injector sc pen Inject 10-15 Units under the skin 3 (Three) Times a Day Before Meals. 5 pen 11   • Insulin Glargine (LANTUS SOLOSTAR) 100 UNIT/ML injection pen Inject 50 Units under the skin Every Night. 45 mL 5   • Insulin Pen Needle (PEN NEEDLES) 31G X 8 MM misc 4 (four) times a day. Use as indicated 4 times daily.     • losartan (COZAAR) 25 MG tablet Take 25 mg by mouth daily.     • metFORMIN (GLUCOPHAGE) 1000 MG tablet Take 1 tablet by mouth Daily With Breakfast. 30 tablet 5   • metoprolol tartrate (LOPRESSOR) 50 MG tablet Take 50 mg by mouth 2 (two) times a day.     • spironolactone (ALDACTONE) 25 MG tablet Take 25 mg by mouth daily.     • vitamin D (ERGOCALCIFEROL) 81141 UNITS capsule capsule Take 1 capsule by mouth Every 7 (Seven) Days. 5 capsule 5     No current facility-administered medications for this visit.      No Known Allergies  Social History     Socioeconomic History   • Marital status: Unknown     Spouse name: Not on file   • Number of children: Not on file   • Years of education: Not on file   • Highest education level: Not on file   Tobacco Use   • Smoking status: Never Smoker   • Smokeless tobacco: Never Used   Substance and Sexual Activity   • Alcohol use: No       Review of Systems  Review of Systems   Constitutional: Negative for activity change, appetite change, diaphoresis and fatigue.   HENT: Negative for facial swelling, sneezing, sore throat, tinnitus, trouble swallowing and voice change.    Eyes: Negative for photophobia, pain, discharge, redness, itching and visual disturbance.   Respiratory: Negative for apnea, cough, choking, chest tightness and shortness of breath.    Cardiovascular: Negative for chest pain, palpitations and leg swelling.   Gastrointestinal: Negative for abdominal distention, abdominal pain, constipation, diarrhea, nausea and vomiting.   Endocrine: Negative for cold intolerance, heat intolerance, polydipsia, polyphagia and  "polyuria.   Genitourinary: Negative for difficulty urinating, dysuria, frequency, hematuria and urgency.   Musculoskeletal: Negative for arthralgias, back pain, gait problem, joint swelling, myalgias, neck pain and neck stiffness.   Skin: Negative for color change, pallor, rash and wound.   Neurological: Negative for dizziness, tremors, weakness, light-headedness, numbness and headaches.   Hematological: Negative for adenopathy. Does not bruise/bleed easily.   Psychiatric/Behavioral: Negative for behavioral problems, confusion and sleep disturbance.        Objective    /70 (BP Location: Left arm, Patient Position: Sitting, Cuff Size: Adult)   Pulse 64   Ht 168.9 cm (66.5\")   Wt 74.4 kg (164 lb)   BMI 26.07 kg/m²   Physical Exam   Constitutional: She is oriented to person, place, and time. She appears well-developed and well-nourished. No distress.   HENT:   Head: Normocephalic and atraumatic.   Right Ear: External ear normal.   Left Ear: External ear normal.   Nose: Nose normal.   Eyes: Conjunctivae and EOM are normal. Pupils are equal, round, and reactive to light.   Neck: Normal range of motion. Neck supple. No tracheal deviation present. No thyromegaly present.   Cardiovascular: Normal rate, regular rhythm and normal heart sounds.   No murmur heard.  Pulmonary/Chest: Effort normal and breath sounds normal. No respiratory distress. She has no wheezes.   Abdominal: Soft. Bowel sounds are normal. There is no tenderness. There is no rebound and no guarding.   Musculoskeletal: Normal range of motion. She exhibits no edema, tenderness or deformity.   Neurological: She is alert and oriented to person, place, and time. No cranial nerve deficit.   Skin: Skin is warm and dry. No rash noted.   Psychiatric: She has a normal mood and affect. Her behavior is normal. Judgment and thought content normal.       Lab Review  Glucose (mg/dL)   Date Value   11/16/2018 125 (H)   08/16/2018 144 (H)   05/15/2018 100     Sodium " (mmol/L)   Date Value   11/16/2018 140   08/16/2018 138   05/15/2018 139     Potassium (mmol/L)   Date Value   11/16/2018 4.6   08/16/2018 5.1   05/15/2018 4.4     Chloride (mmol/L)   Date Value   11/16/2018 96   08/16/2018 98   05/15/2018 99     CO2 (mmol/L)   Date Value   11/16/2018 30.0   08/16/2018 29.0   05/15/2018 29.0     BUN (mg/dL)   Date Value   11/16/2018 25 (H)   08/16/2018 30 (H)   05/15/2018 32 (H)     Creatinine (mg/dL)   Date Value   11/16/2018 1.57 (H)   08/16/2018 1.53 (H)   05/15/2018 1.37 (H)     Hemoglobin A1C (%)   Date Value   11/16/2018 9.9 (H)   08/16/2018 9.4 (H)   05/15/2018 9.4 (H)     Triglycerides (mg/dL)   Date Value   11/16/2018 185   05/15/2018 179   11/14/2017 139     LDL Cholesterol  (mg/dL)   Date Value   11/16/2018 62   05/15/2018 58   11/14/2017 53       Assessment/Plan      1. Uncontrolled type 2 diabetes mellitus with complication, with long-term current use of insulin (CMS/[facility])    2. Essential hypertension    3. Other hyperlipidemia    4. Vitamin D deficiency    5. Hypothyroidism due to medication    6. Noncompliance with diabetes treatment    .    Medications prescribed:  Outpatient Encounter Medications as of 11/26/2018   Medication Sig Dispense Refill   • aspirin 81 MG tablet Take 81 mg by mouth daily.     • atorvastatin (LIPITOR) 40 MG tablet Take 40 mg by mouth daily.     • bumetanide (BUMEX) 1 MG tablet      • clopidogrel (PLAVIX) 75 MG tablet Take 75 mg by mouth daily.     • insulin aspart (NOVOLOG FLEXPEN) 100 UNIT/ML solution pen-injector sc pen Inject 10-15 Units under the skin 3 (Three) Times a Day Before Meals. 5 pen 11   • Insulin Glargine (LANTUS SOLOSTAR) 100 UNIT/ML injection pen Inject 50 Units under the skin Every Night. 45 mL 5   • Insulin Pen Needle (PEN NEEDLES) 31G X 8 MM misc 4 (four) times a day. Use as indicated 4 times daily.     • losartan (COZAAR) 25 MG tablet Take 25 mg by mouth daily.     • metFORMIN (GLUCOPHAGE) 1000 MG tablet Take 1 tablet by  mouth Daily With Breakfast. 30 tablet 5   • metoprolol tartrate (LOPRESSOR) 50 MG tablet Take 50 mg by mouth 2 (two) times a day.     • spironolactone (ALDACTONE) 25 MG tablet Take 25 mg by mouth daily.     • vitamin D (ERGOCALCIFEROL) 37051 UNITS capsule capsule Take 1 capsule by mouth Every 7 (Seven) Days. 5 capsule 5     No facility-administered encounter medications on file as of 11/26/2018.        Orders placed during this encounter include:  Orders Placed This Encounter   Procedures   • Comprehensive Metabolic Panel   • Hemoglobin A1c   • TSH   • Vitamin D 25 Hydroxy   • Lipid Panel   • CBC & Differential     Order Specific Question:   Manual Differential     Answer:   No   Glycemic Management     Lab Results   Component Value Date    HGBA1C 9.9 (H) 11/16/2018                         Lantus   40 units        if you wake up with a sugar in the morning  less than 100 -- decrease by 5 units and don't raise it again      --------     Janumet 100/1000 mg with supper----stopped due to expensive      metformin 1000mg one at supper --not taking -- try taking 500 mg once at night -- stop due to GFR         ----------     stop glimepiride since not enough        -----------     Novolog  every meal-not giving    We discussed the consequences of not taking insulin and high blood sugars         Please give for your meals      151-200 : 2 units   201-250 : 4units  251-300 : 6units  above 300 : 7 units     +     3 units per 15 grams of CHO               please cover your nighttime snack         Lipid Management        ldl - 61 feb. 2015     TG - 300     does not want to take any mediations - will try fish oil 1gram two capsules po BID     May 2015     tg - 300     did not start fish oil   refuses prescription medication    Component      Latest Ref Rng & Units 11/16/2018   Total Cholesterol      0 - 199 mg/dL 140   Triglycerides      20 - 199 mg/dL 185   HDL Cholesterol      60 - 200 mg/dL 52 (L)   LDL Cholesterol      1 -  129 mg/dL 62   LDL/HDL Ratio      0.00 - 3.22 0.98               Blood Pressure Management           on metoprolol 50 bid  ---Decrease to 1/2 tablet po BID  aldactone 25 mg qd - 1/2 tablet daily   losartan 25 mg qd  furosemide 40 mg qd--- stopped  Bumex 1 mg taking 2 tablets daily --- decreased to one daily       amiodarone 200 mg daily              Microvascular Complication Monitoring     eye exam -- les sthan one year     Component      Latest Ref Rng & Units 11/14/2017 11/14/2017      8:42 AM  8:42 AM   Protein/Creatinine Ratio, Urine      0.0 - 200.0 mg/G Crea 48.3     Creatinine, Urine      mg/dL 142.9 142.9   Total Protein, Urine      mg/dL 6.9     Microalbumin/Creatinine Ratio      0.0 - 30.0 mg/g       Microalbumin, Urine      mg/L   <0.6         Bone Health                Component      Latest Ref Rng & Units 11/16/2018   25 Hydroxy, Vitamin D      30.0 - 100.0 ng/ml 29.6 (L)      taking otc daily   Other Diabetes Related Aspects              Lab Results   Component Value Date     TSH 4.440 05/15/2018           Hypothyroidism due to medication         Component      Latest Ref Rng & Units 11/16/2018   TSH Baseline      0.460 - 4.680 mIU/mL 7.000 (H)   Free T4      0.78 - 2.19 ng/dL 1.88   T3, Total      97.0 - 169.0 ng/dl 117.0   Thyrotropin Receptor Antibody      0.00 - 1.75 IU/L <0.50   Thyroid Stimulating Immunoglobulin      0.00 - 0.55 IU/L <0.10   Thyroid Peroxidase Antibody      0 - 34 IU/mL 12     Appears to be amiodarone induced       Start 25 mcg levothyroxine daily       Medication must me taken on an empty stomach at least one hour before food, drink and other medications. Only take with water. If taking vitamins or antiacids needs to be 4 hours before or after.      Lab Results   Component Value Date    [id number] 382 11/16/2018         4. Follow-up: Return in about 3 months (around 2/26/2019) for Recheck.

## 2019-03-20 RX ORDER — INSULIN GLARGINE 100 [IU]/ML
INJECTION, SOLUTION SUBCUTANEOUS
Qty: 5 PEN | Refills: 5 | Status: SHIPPED | OUTPATIENT
Start: 2019-03-20 | End: 2019-12-16 | Stop reason: SDUPTHER

## 2019-06-14 ENCOUNTER — APPOINTMENT (OUTPATIENT)
Dept: LAB | Facility: HOSPITAL | Age: 74
End: 2019-06-14

## 2019-06-14 LAB
25(OH)D3 SERPL-MCNC: 28.1 NG/ML (ref 30–100)
ALBUMIN SERPL-MCNC: 3.6 G/DL (ref 3.5–5.2)
ALBUMIN/GLOB SERPL: 0.9 G/DL
ALP SERPL-CCNC: 48 U/L (ref 39–117)
ALT SERPL W P-5'-P-CCNC: 29 U/L (ref 1–33)
ANION GAP SERPL CALCULATED.3IONS-SCNC: 15.5 MMOL/L
AST SERPL-CCNC: 23 U/L (ref 1–32)
BASOPHILS # BLD AUTO: 0.06 10*3/MM3 (ref 0–0.2)
BASOPHILS NFR BLD AUTO: 1.2 % (ref 0–1.5)
BILIRUB SERPL-MCNC: 1.4 MG/DL (ref 0.2–1.2)
BUN BLD-MCNC: 23 MG/DL (ref 8–23)
BUN/CREAT SERPL: 15.5 (ref 7–25)
CALCIUM SPEC-SCNC: 9.5 MG/DL (ref 8.6–10.5)
CHLORIDE SERPL-SCNC: 97 MMOL/L (ref 98–107)
CHOLEST SERPL-MCNC: 139 MG/DL (ref 0–200)
CO2 SERPL-SCNC: 28.5 MMOL/L (ref 22–29)
CREAT BLD-MCNC: 1.48 MG/DL (ref 0.57–1)
DEPRECATED RDW RBC AUTO: 57.9 FL (ref 37–54)
EOSINOPHIL # BLD AUTO: 0.11 10*3/MM3 (ref 0–0.4)
EOSINOPHIL NFR BLD AUTO: 2.1 % (ref 0.3–6.2)
ERYTHROCYTE [DISTWIDTH] IN BLOOD BY AUTOMATED COUNT: 17.4 % (ref 12.3–15.4)
GFR SERPL CREATININE-BSD FRML MDRD: 34 ML/MIN/1.73
GLOBULIN UR ELPH-MCNC: 3.9 GM/DL
GLUCOSE BLD-MCNC: 141 MG/DL (ref 65–99)
HBA1C MFR BLD: 8.83 % (ref 4.8–5.6)
HCT VFR BLD AUTO: 42.6 % (ref 34–46.6)
HDLC SERPL-MCNC: 51 MG/DL (ref 40–60)
HGB BLD-MCNC: 13.1 G/DL (ref 12–15.9)
IMM GRANULOCYTES # BLD AUTO: 0.04 10*3/MM3 (ref 0–0.05)
IMM GRANULOCYTES NFR BLD AUTO: 0.8 % (ref 0–0.5)
LDLC SERPL CALC-MCNC: 64 MG/DL (ref 0–100)
LDLC/HDLC SERPL: 1.26 {RATIO}
LYMPHOCYTES # BLD AUTO: 0.8 10*3/MM3 (ref 0.7–3.1)
LYMPHOCYTES NFR BLD AUTO: 15.4 % (ref 19.6–45.3)
MCH RBC QN AUTO: 27.6 PG (ref 26.6–33)
MCHC RBC AUTO-ENTMCNC: 30.8 G/DL (ref 31.5–35.7)
MCV RBC AUTO: 89.9 FL (ref 79–97)
MONOCYTES # BLD AUTO: 0.5 10*3/MM3 (ref 0.1–0.9)
MONOCYTES NFR BLD AUTO: 9.6 % (ref 5–12)
NEUTROPHILS # BLD AUTO: 3.68 10*3/MM3 (ref 1.7–7)
NEUTROPHILS NFR BLD AUTO: 70.9 % (ref 42.7–76)
NRBC BLD AUTO-RTO: 0 /100 WBC (ref 0–0.2)
PLATELET # BLD AUTO: 233 10*3/MM3 (ref 140–450)
PMV BLD AUTO: 11.6 FL (ref 6–12)
POTASSIUM BLD-SCNC: 4 MMOL/L (ref 3.5–5.2)
PROT SERPL-MCNC: 7.5 G/DL (ref 6–8.5)
RBC # BLD AUTO: 4.74 10*6/MM3 (ref 3.77–5.28)
SODIUM BLD-SCNC: 141 MMOL/L (ref 136–145)
TRIGL SERPL-MCNC: 119 MG/DL (ref 0–150)
TSH SERPL DL<=0.05 MIU/L-ACNC: 2.09 MIU/ML (ref 0.27–4.2)
VLDLC SERPL-MCNC: 23.8 MG/DL (ref 5–40)
WBC NRBC COR # BLD: 5.19 10*3/MM3 (ref 3.4–10.8)

## 2019-06-14 PROCEDURE — 80061 LIPID PANEL: CPT | Performed by: NURSE PRACTITIONER

## 2019-06-14 PROCEDURE — 85025 COMPLETE CBC W/AUTO DIFF WBC: CPT | Performed by: NURSE PRACTITIONER

## 2019-06-14 PROCEDURE — 83036 HEMOGLOBIN GLYCOSYLATED A1C: CPT | Performed by: NURSE PRACTITIONER

## 2019-06-14 PROCEDURE — 84443 ASSAY THYROID STIM HORMONE: CPT | Performed by: NURSE PRACTITIONER

## 2019-06-14 PROCEDURE — 80053 COMPREHEN METABOLIC PANEL: CPT | Performed by: NURSE PRACTITIONER

## 2019-06-14 PROCEDURE — 82306 VITAMIN D 25 HYDROXY: CPT | Performed by: NURSE PRACTITIONER

## 2019-06-20 ENCOUNTER — OFFICE VISIT (OUTPATIENT)
Dept: ENDOCRINOLOGY | Facility: CLINIC | Age: 74
End: 2019-06-20

## 2019-06-20 VITALS
SYSTOLIC BLOOD PRESSURE: 104 MMHG | BODY MASS INDEX: 22.31 KG/M2 | HEIGHT: 67 IN | DIASTOLIC BLOOD PRESSURE: 58 MMHG | WEIGHT: 142.13 LBS

## 2019-06-20 DIAGNOSIS — E03.2 HYPOTHYROIDISM DUE TO MEDICATION: ICD-10-CM

## 2019-06-20 DIAGNOSIS — IMO0002 UNCONTROLLED TYPE 2 DIABETES MELLITUS WITH COMPLICATION, WITH LONG-TERM CURRENT USE OF INSULIN: Primary | ICD-10-CM

## 2019-06-20 DIAGNOSIS — I10 ESSENTIAL HYPERTENSION: ICD-10-CM

## 2019-06-20 DIAGNOSIS — E55.9 VITAMIN D DEFICIENCY: ICD-10-CM

## 2019-06-20 DIAGNOSIS — E78.49 OTHER HYPERLIPIDEMIA: ICD-10-CM

## 2019-06-20 PROCEDURE — 99214 OFFICE O/P EST MOD 30 MIN: CPT | Performed by: NURSE PRACTITIONER

## 2019-06-20 RX ORDER — HYDRALAZINE HYDROCHLORIDE 50 MG/1
50 TABLET, FILM COATED ORAL 3 TIMES DAILY
COMMUNITY
End: 2020-09-09

## 2019-06-20 RX ORDER — ISOSORBIDE DINITRATE 30 MG/1
30 TABLET ORAL 3 TIMES DAILY
COMMUNITY
End: 2020-09-09

## 2019-06-20 RX ORDER — AMIODARONE HYDROCHLORIDE 200 MG/1
200 TABLET ORAL DAILY
COMMUNITY

## 2019-06-20 RX ORDER — LEVOTHYROXINE SODIUM 0.03 MG/1
25 TABLET ORAL DAILY
Qty: 30 TABLET | Refills: 11 | Status: SHIPPED | OUTPATIENT
Start: 2019-06-20 | End: 2019-10-18 | Stop reason: SDUPTHER

## 2019-06-20 RX ORDER — POTASSIUM CHLORIDE 20MEQ/15ML
LIQUID (ML) ORAL DAILY
COMMUNITY
End: 2021-03-25

## 2019-06-20 RX ORDER — PHENOL 1.4 %
600 AEROSOL, SPRAY (ML) MUCOUS MEMBRANE DAILY
COMMUNITY
End: 2020-09-09

## 2019-06-20 NOTE — PROGRESS NOTES
Subjective    Jamilah Vee is a 74 y.o. female. she is here today for follow-up.    History of Present Illness     Duration/Timing:  Diabetes mellitus type 2, Age at onset of diabetes: 61 years, Onset of symptoms gradual  timing - constant     quality - not controlled     severity - high    She has been in the hospital since last visit to regulate heart rate      Severity (Complications/Hospitalizations)  Secondary Macrovascular Complications:  CAD   2 stents in May 2014  ICD - defibrillator  Secondary Microvascular Complications:  No Diabetic Nephropathy, No proteinuria, No Diabetic Retinopathy, No Diabetic Neuropathy     Context  Diabetes Regimen:  Insulin, Oral Medications     Lab Results   Component Value Date    HGBA1C 8.83 (H) 06/14/2019            Blood Glucose Readings     States improving     This am 80         Diet  low carb intake  Exercise:  Does not exercise     Associated Signs/Symptoms  Hyperglycemic Symptoms:  No polyuria, No polydipsia, No polyphagia  Hypoglycemic Episodes:  No documented hypoglycemia  Modifying Factors/Comorbidities:  Hypertension, Hyperlipidemia                 The following portions of the patient's history were reviewed and updated as appropriate:   Past Medical History:   Diagnosis Date   • Dyslipidemia    • Essential hypertension    • Hypercalcemia    • Osteopenia    • Type II diabetes mellitus, uncontrolled (CMS/[facility])    • Vitamin D deficiency      Past Surgical History:   Procedure Laterality Date   • PACEMAKER IMPLANTATION      Pacemaker AICD pocket (1)       Family History   Problem Relation Age of Onset   • Coronary artery disease Other    • Hypertension Other      OB History     No data available        Current Outpatient Medications   Medication Sig Dispense Refill   • amiodarone (PACERONE) 200 MG tablet Take 200 mg by mouth Daily.     • aspirin 81 MG tablet Take 81 mg by mouth daily.     • atorvastatin (LIPITOR) 40 MG tablet Take 40 mg by mouth daily.     •  bumetanide (BUMEX) 1 MG tablet Take 2 mg by mouth Daily.     • calcium carbonate (OS-CARLOS) 600 MG tablet Take 600 mg by mouth Daily.     • clopidogrel (PLAVIX) 75 MG tablet Take 75 mg by mouth daily.     • hydrALAZINE (APRESOLINE) 50 MG tablet Take 50 mg by mouth 3 (Three) Times a Day.     • insulin aspart (NOVOLOG FLEXPEN) 100 UNIT/ML solution pen-injector sc pen Inject 10-15 Units under the skin 3 (Three) Times a Day Before Meals. 5 pen 11   • Insulin Pen Needle (PEN NEEDLES) 31G X 8 MM misc 4 (four) times a day. Use as indicated 4 times daily.     • isosorbide dinitrate (ISORDIL) 30 MG tablet Take 30 mg by mouth 3 (Three) Times a Day.     • LANTUS SOLOSTAR 100 UNIT/ML injection pen INJECT 50 UNITS SUBCUTANEOUSLY EVERY NIGHT (Patient taking differently: INJECT 30 UNITS SUBCUTANEOUSLY EVERY NIGHT) 5 pen 5   • levothyroxine (SYNTHROID, LEVOTHROID) 25 MCG tablet Take 1 tablet by mouth Daily. 30 tablet 11   • losartan (COZAAR) 25 MG tablet Take 25 mg by mouth As Needed (Takes when BP is over systolic is over 110.).     • potassium chloride (KAYCIEL) 20 MEQ/15ML (10%) solution Take  by mouth Daily.     • spironolactone (ALDACTONE) 25 MG tablet Take 25 mg by mouth daily.     • metoprolol tartrate (LOPRESSOR) 50 MG tablet Take 25 mg by mouth Daily. One-half tablet daily       No current facility-administered medications for this visit.      No Known Allergies  Social History     Socioeconomic History   • Marital status: Unknown     Spouse name: Not on file   • Number of children: Not on file   • Years of education: Not on file   • Highest education level: Not on file   Tobacco Use   • Smoking status: Never Smoker   • Smokeless tobacco: Never Used   Substance and Sexual Activity   • Alcohol use: No       Review of Systems  Review of Systems   Constitutional: Negative for activity change, appetite change, diaphoresis and fatigue.   HENT: Negative for facial swelling, sneezing, sore throat, tinnitus, trouble swallowing and  "voice change.    Eyes: Negative for photophobia, pain, discharge, redness, itching and visual disturbance.   Respiratory: Negative for apnea, cough, choking, chest tightness and shortness of breath.    Cardiovascular: Negative for chest pain, palpitations and leg swelling.   Gastrointestinal: Negative for abdominal distention, abdominal pain, constipation, diarrhea, nausea and vomiting.   Endocrine: Negative for cold intolerance, heat intolerance, polydipsia, polyphagia and polyuria.   Genitourinary: Negative for difficulty urinating, dysuria, frequency, hematuria and urgency.   Musculoskeletal: Negative for arthralgias, back pain, gait problem, joint swelling, myalgias, neck pain and neck stiffness.   Skin: Negative for color change, pallor, rash and wound.   Neurological: Negative for dizziness, tremors, weakness, light-headedness, numbness and headaches.   Hematological: Negative for adenopathy. Does not bruise/bleed easily.   Psychiatric/Behavioral: Negative for behavioral problems, confusion and sleep disturbance.        Objective    /58 (BP Location: Left arm, Patient Position: Sitting, Cuff Size: Adult)   Ht 168.9 cm (66.5\")   Wt 64.5 kg (142 lb 2 oz)   BMI 22.60 kg/m²   Physical Exam   Constitutional: She is oriented to person, place, and time. She appears well-developed and well-nourished. No distress.   HENT:   Head: Normocephalic and atraumatic.   Right Ear: External ear normal.   Left Ear: External ear normal.   Nose: Nose normal.   Eyes: Conjunctivae and EOM are normal. Pupils are equal, round, and reactive to light.   Neck: Normal range of motion. Neck supple. No tracheal deviation present. No thyromegaly present.   Cardiovascular: Normal rate, regular rhythm and normal heart sounds.   No murmur heard.  Pulmonary/Chest: Effort normal and breath sounds normal. No respiratory distress. She has no wheezes.   Abdominal: Soft. Bowel sounds are normal. There is no tenderness. There is no rebound and " no guarding.   Musculoskeletal: Normal range of motion. She exhibits no edema, tenderness or deformity.   Neurological: She is alert and oriented to person, place, and time. No cranial nerve deficit.   Skin: Skin is warm and dry. No rash noted.   Psychiatric: She has a normal mood and affect. Her behavior is normal. Judgment and thought content normal.       Lab Review  Glucose (mg/dL)   Date Value   06/14/2019 141 (H)   11/16/2018 125 (H)   08/16/2018 144 (H)     Sodium (mmol/L)   Date Value   06/14/2019 141   11/16/2018 140   08/16/2018 138     Potassium (mmol/L)   Date Value   06/14/2019 4.0   02/28/2019 4.1   11/16/2018 4.6   08/16/2018 5.1     Chloride (mmol/L)   Date Value   06/14/2019 97 (L)   11/16/2018 96   08/16/2018 98     CO2 (mmol/L)   Date Value   06/14/2019 28.5   11/16/2018 30.0   08/16/2018 29.0     BUN (mg/dL)   Date Value   06/14/2019 23   11/16/2018 25 (H)   08/16/2018 30 (H)     Creatinine (mg/dL)   Date Value   06/14/2019 1.48 (H)   11/16/2018 1.57 (H)   08/16/2018 1.53 (H)     Hemoglobin A1C (%)   Date Value   06/14/2019 8.83 (H)   11/16/2018 9.9 (H)   08/16/2018 9.4 (H)   05/15/2018 9.4 (H)     Triglycerides (mg/dL)   Date Value   06/14/2019 119   11/16/2018 185   05/15/2018 179     LDL Cholesterol  (mg/dL)   Date Value   06/14/2019 64   11/16/2018 62   05/15/2018 58   11/14/2017 53       Assessment/Plan      1. Uncontrolled type 2 diabetes mellitus with complication, with long-term current use of insulin (CMS/[facility])    2. Hypothyroidism due to medication    3. Essential hypertension    4. Other hyperlipidemia    5. Vitamin D deficiency    .    Medications prescribed:  Outpatient Encounter Medications as of 6/20/2019   Medication Sig Dispense Refill   • amiodarone (PACERONE) 200 MG tablet Take 200 mg by mouth Daily.     • aspirin 81 MG tablet Take 81 mg by mouth daily.     • atorvastatin (LIPITOR) 40 MG tablet Take 40 mg by mouth daily.     • bumetanide (BUMEX) 1 MG tablet Take 2 mg by mouth  Daily.     • calcium carbonate (OS-CARLOS) 600 MG tablet Take 600 mg by mouth Daily.     • clopidogrel (PLAVIX) 75 MG tablet Take 75 mg by mouth daily.     • hydrALAZINE (APRESOLINE) 50 MG tablet Take 50 mg by mouth 3 (Three) Times a Day.     • insulin aspart (NOVOLOG FLEXPEN) 100 UNIT/ML solution pen-injector sc pen Inject 10-15 Units under the skin 3 (Three) Times a Day Before Meals. 5 pen 11   • Insulin Pen Needle (PEN NEEDLES) 31G X 8 MM misc 4 (four) times a day. Use as indicated 4 times daily.     • isosorbide dinitrate (ISORDIL) 30 MG tablet Take 30 mg by mouth 3 (Three) Times a Day.     • LANTUS SOLOSTAR 100 UNIT/ML injection pen INJECT 50 UNITS SUBCUTANEOUSLY EVERY NIGHT (Patient taking differently: INJECT 30 UNITS SUBCUTANEOUSLY EVERY NIGHT) 5 pen 5   • levothyroxine (SYNTHROID, LEVOTHROID) 25 MCG tablet Take 1 tablet by mouth Daily. 30 tablet 11   • losartan (COZAAR) 25 MG tablet Take 25 mg by mouth As Needed (Takes when BP is over systolic is over 110.).     • potassium chloride (KAYCIEL) 20 MEQ/15ML (10%) solution Take  by mouth Daily.     • spironolactone (ALDACTONE) 25 MG tablet Take 25 mg by mouth daily.     • [DISCONTINUED] levothyroxine (SYNTHROID, LEVOTHROID) 25 MCG tablet Take 1 tablet by mouth Daily. 30 tablet 11   • metoprolol tartrate (LOPRESSOR) 50 MG tablet Take 25 mg by mouth Daily. One-half tablet daily     • [DISCONTINUED] metFORMIN (GLUCOPHAGE) 1000 MG tablet Take 1 tablet by mouth Daily With Breakfast. 30 tablet 5   • [DISCONTINUED] vitamin D (ERGOCALCIFEROL) 03065 UNITS capsule capsule Take 1 capsule by mouth Every 7 (Seven) Days. 5 capsule 5     No facility-administered encounter medications on file as of 6/20/2019.        Orders placed during this encounter include:  Orders Placed This Encounter   Procedures   • Comprehensive Metabolic Panel   • Hemoglobin A1c   • TSH   • Vitamin B12   • Vitamin D 25 Hydroxy   • CBC & Differential     Order Specific Question:   Manual Differential      Answer:   No     Glycemic Management      Lab Results   Component Value Date    HGBA1C 8.83 (H) 06/14/2019            Lantus   38  units        if you wake up with a sugar in the morning  less than 100 -- decrease by 5 units and don't raise it again      --------     Janumet 100/1000 mg with supper----stopped due to expensive      metformin 1000mg one at supper --not taking -- try taking 500 mg once at night -- stop due to GFR         ----------     stop glimepiride since not enough        -----------     Novolog  every meal   Not using --please give for meals     We discussed the consequences of not taking insulin and high blood sugars         Please give for your meals      151-200 : 2 units   201-250 : 4units  251-300 : 6units  above 300 : 7 units     +     3 units per 15 grams of CHO               please cover your nighttime snack         Lipid Management                  Component      Latest Ref Rng & Units 6/14/2019   Total Cholesterol      0 - 200 mg/dL 139   Triglycerides      0 - 150 mg/dL 119   HDL Cholesterol      40 - 60 mg/dL 51   LDL Cholesterol       0 - 100 mg/dL 64   VLDL Cholesterol      5 - 40 mg/dL 23.8   LDL/HDL Ratio       1.26      lipitor 40 mg one night      Blood Pressure Management           on metoprolol 50 bid  ---Decrease to 1/2 tablet po BID  aldactone 25 mg qd - 1/2 tablet daily   losartan 25 mg qd  furosemide 40 mg qd--- stopped  Bumex 1 mg taking 2 tablets daily --- decreased to one daily         amiodarone 200 mg daily               Microvascular Complication Monitoring     eye exam -- les sthan one year     Component      Latest Ref Rng & Units 11/14/2017 11/14/2017      8:42 AM  8:42 AM   Protein/Creatinine Ratio, Urine      0.0 - 200.0 mg/G Crea 48.3     Creatinine, Urine      mg/dL 142.9 142.9   Total Protein, Urine      mg/dL 6.9     Microalbumin/Creatinine Ratio      0.0 - 30.0 mg/g       Microalbumin, Urine      mg/L   <0.6         Bone Health        Component      Latest  Ref Rng & Units 6/14/2019   25 Hydroxy, Vitamin D      30.0 - 100.0 ng/ml 28.1 (L)           taking otc daily   Other Diabetes Related Aspects                  Lab Results   Component Value Date     TSH 4.440 05/15/2018            Hypothyroidism due to medication          Component      Latest Ref Rng & Units 11/16/2018   TSH Baseline      0.460 - 4.680 mIU/mL 7.000 (H)   Free T4      0.78 - 2.19 ng/dL 1.88   T3, Total      97.0 - 169.0 ng/dl 117.0   Thyrotropin Receptor Antibody      0.00 - 1.75 IU/L <0.50   Thyroid Stimulating Immunoglobulin      0.00 - 0.55 IU/L <0.10   Thyroid Peroxidase Antibody      0 - 34 IU/mL 12      Appears to be amiodarone induced         25 mcg levothyroxine daily         Medication must me taken on an empty stomach at least one hour before food, drink and other medications. Only take with water. If taking vitamins or antiacids needs to be 4 hours before or after.       Lab Results   Component Value Date    TSH 2.090 06/14/2019       Lab Results   Component Value Date    [id number] 11/16/2018                   4. Follow-up: Return in about 3 months (around 9/20/2019) for Recheck.

## 2019-06-25 RX ORDER — INSULIN ASPART 100 [IU]/ML
INJECTION, SOLUTION INTRAVENOUS; SUBCUTANEOUS
Qty: 5 PEN | Refills: 5 | Status: SHIPPED | OUTPATIENT
Start: 2019-06-25 | End: 2020-06-08 | Stop reason: SDUPTHER

## 2019-07-12 ENCOUNTER — OFFICE VISIT (OUTPATIENT)
Dept: FAMILY MEDICINE CLINIC | Facility: CLINIC | Age: 74
End: 2019-07-12

## 2019-07-12 VITALS
SYSTOLIC BLOOD PRESSURE: 112 MMHG | WEIGHT: 141.38 LBS | BODY MASS INDEX: 22.19 KG/M2 | DIASTOLIC BLOOD PRESSURE: 60 MMHG | HEART RATE: 74 BPM | HEIGHT: 67 IN | OXYGEN SATURATION: 94 %

## 2019-07-12 DIAGNOSIS — E78.49 OTHER HYPERLIPIDEMIA: ICD-10-CM

## 2019-07-12 DIAGNOSIS — I10 ESSENTIAL HYPERTENSION: Primary | ICD-10-CM

## 2019-07-12 DIAGNOSIS — J40 BRONCHITIS: ICD-10-CM

## 2019-07-12 DIAGNOSIS — E03.2 HYPOTHYROIDISM DUE TO MEDICATION: ICD-10-CM

## 2019-07-12 DIAGNOSIS — E11.8 TYPE 2 DIABETES MELLITUS WITH COMPLICATION, UNSPECIFIED WHETHER LONG TERM INSULIN USE: ICD-10-CM

## 2019-07-12 PROCEDURE — 99204 OFFICE O/P NEW MOD 45 MIN: CPT | Performed by: FAMILY MEDICINE

## 2019-07-12 RX ORDER — ALBUTEROL SULFATE 90 UG/1
AEROSOL, METERED RESPIRATORY (INHALATION)
Qty: 18 G | Refills: 3 | Status: SHIPPED | OUTPATIENT
Start: 2019-07-12 | End: 2020-09-09

## 2019-07-12 RX ORDER — AZITHROMYCIN 250 MG/1
TABLET, FILM COATED ORAL
Qty: 6 TABLET | Refills: 0 | Status: SHIPPED | OUTPATIENT
Start: 2019-07-12 | End: 2020-09-09

## 2019-07-12 NOTE — PROGRESS NOTES
Subjective   Jamilah Vee is a 74 y.o. female.    cc: follow up  History of Present Illness The patient comes in for evaluation and to establish care. She has Diabetes mellitus,Hypothyroidism,Hyperlipidemia and Hypertension.She has been coughing for two weeks and it is productive of a yellow sputum.    The following portions of the patient's history were reviewed and updated as appropriate: allergies, current medications, past family history, past medical history, past social history, past surgical history and problem list.    Review of Systems   Constitutional: Positive for appetite change and fatigue. Negative for activity change, chills, diaphoresis, fever, unexpected weight gain and unexpected weight loss.   HENT: Positive for postnasal drip and rhinorrhea. Negative for congestion, dental problem, drooling, ear discharge, ear pain, facial swelling, hearing loss, mouth sores, nosebleeds, sinus pressure, sneezing, sore throat, tinnitus, trouble swallowing and voice change.    Eyes: Negative for blurred vision, double vision, photophobia, pain, discharge, redness, itching and visual disturbance.   Respiratory: Positive for cough. Negative for apnea, choking, chest tightness, shortness of breath, wheezing and stridor.    Cardiovascular: Negative for chest pain, palpitations and leg swelling.   Gastrointestinal: Negative for abdominal distention, abdominal pain, anal bleeding, blood in stool, constipation, diarrhea, nausea, rectal pain, vomiting, GERD and indigestion.   Endocrine: Negative for cold intolerance, heat intolerance, polydipsia, polyphagia and polyuria.   Genitourinary: Negative for breast discharge, urinary incontinence, decreased libido, decreased urine volume, difficulty urinating, dyspareunia, dysuria, flank pain, frequency, genital sores, hematuria, pelvic pain, urgency, vaginal bleeding, vaginal discharge, vaginal pain, breast lump and breast pain.   Musculoskeletal: Negative for arthralgias, back  pain, gait problem, joint swelling, myalgias, neck pain, neck stiffness and bursitis.   Skin: Positive for bruise. Negative for color change, dry skin, pallor, rash and skin lesions.   Allergic/Immunologic: Negative for environmental allergies, food allergies and immunocompromised state.   Neurological: Positive for memory problem. Negative for dizziness, tremors, seizures, syncope, facial asymmetry, speech difficulty, weakness, light-headedness, numbness, headache and confusion.   Hematological: Negative for adenopathy. Does not bruise/bleed easily.   Psychiatric/Behavioral: Positive for decreased concentration. Negative for agitation, behavioral problems, dysphoric mood, hallucinations, self-injury, sleep disturbance, suicidal ideas, negative for hyperactivity, depressed mood and stress. The patient is not nervous/anxious.        Objective   Physical Exam   Constitutional: She is oriented to person, place, and time. She appears well-developed and well-nourished.   HENT:   Head: Normocephalic and atraumatic.   Right Ear: External ear normal.   Left Ear: External ear normal.   Nose: Nose normal.   Mouth/Throat: Oropharynx is clear and moist.   Eyes: Conjunctivae are normal. Pupils are equal, round, and reactive to light.   Neck: Normal range of motion.   Cardiovascular: Normal rate, regular rhythm and normal heart sounds. Exam reveals no gallop and no friction rub.   No murmur heard.  Pulmonary/Chest: Effort normal and breath sounds normal. No stridor. No respiratory distress. She has no wheezes. She has no rales.   Abdominal: Soft. Bowel sounds are normal. She exhibits no distension and no mass. There is no tenderness. There is no guarding.   Musculoskeletal:   Back is non tender.   Neurological: She is alert and oriented to person, place, and time.   Skin: Skin is warm and dry.   Vitals reviewed.        Assessment/Plan   Jamilah was seen today for establish care, cough and nasal congestion.    Diagnoses and all  orders for this visit:    Essential hypertension    Other hyperlipidemia    Hypothyroidism due to medication    Type 2 diabetes mellitus with complication, unspecified whether long term insulin use (CMS/[facility])    Bronchitis    Other orders  -     albuterol sulfate  (90 Base) MCG/ACT inhaler; 2 puffs morning, noon,4 o'clock and at bedtime for one week,then 4 times daily as needed.  -     azithromycin (ZITHROMAX) 250 MG tablet; Take 2 tablets the first day, then 1 tablet daily for 4 days.        Return to the clinic in 3 month/s.  Will contact with results as needed.  Reviewed her labw.

## 2019-10-11 ENCOUNTER — APPOINTMENT (OUTPATIENT)
Dept: LAB | Facility: HOSPITAL | Age: 74
End: 2019-10-11

## 2019-10-11 LAB
25(OH)D3 SERPL-MCNC: 26.8 NG/ML (ref 30–100)
ALBUMIN SERPL-MCNC: 4.2 G/DL (ref 3.5–5.2)
ALBUMIN/GLOB SERPL: 1.2 G/DL
ALP SERPL-CCNC: 54 U/L (ref 39–117)
ALT SERPL W P-5'-P-CCNC: 26 U/L (ref 1–33)
ANION GAP SERPL CALCULATED.3IONS-SCNC: 13.6 MMOL/L (ref 5–15)
AST SERPL-CCNC: 26 U/L (ref 1–32)
BASOPHILS # BLD AUTO: 0.05 10*3/MM3 (ref 0–0.2)
BASOPHILS NFR BLD AUTO: 0.6 % (ref 0–1.5)
BILIRUB SERPL-MCNC: 1.9 MG/DL (ref 0.2–1.2)
BUN BLD-MCNC: 22 MG/DL (ref 8–23)
BUN/CREAT SERPL: 13.3 (ref 7–25)
CALCIUM SPEC-SCNC: 9.8 MG/DL (ref 8.6–10.5)
CHLORIDE SERPL-SCNC: 95 MMOL/L (ref 98–107)
CO2 SERPL-SCNC: 30.4 MMOL/L (ref 22–29)
CREAT BLD-MCNC: 1.66 MG/DL (ref 0.57–1)
DEPRECATED RDW RBC AUTO: 43.8 FL (ref 37–54)
EOSINOPHIL # BLD AUTO: 0.04 10*3/MM3 (ref 0–0.4)
EOSINOPHIL NFR BLD AUTO: 0.5 % (ref 0.3–6.2)
ERYTHROCYTE [DISTWIDTH] IN BLOOD BY AUTOMATED COUNT: 14.4 % (ref 12.3–15.4)
GFR SERPL CREATININE-BSD FRML MDRD: 30 ML/MIN/1.73
GLOBULIN UR ELPH-MCNC: 3.6 GM/DL
GLUCOSE BLD-MCNC: 71 MG/DL (ref 65–99)
HBA1C MFR BLD: 8.6 % (ref 4.8–5.6)
HCT VFR BLD AUTO: 40.1 % (ref 34–46.6)
HGB BLD-MCNC: 13.1 G/DL (ref 12–15.9)
IMM GRANULOCYTES # BLD AUTO: 0.02 10*3/MM3 (ref 0–0.05)
IMM GRANULOCYTES NFR BLD AUTO: 0.3 % (ref 0–0.5)
LYMPHOCYTES # BLD AUTO: 0.72 10*3/MM3 (ref 0.7–3.1)
LYMPHOCYTES NFR BLD AUTO: 9.3 % (ref 19.6–45.3)
MCH RBC QN AUTO: 27.9 PG (ref 26.6–33)
MCHC RBC AUTO-ENTMCNC: 32.7 G/DL (ref 31.5–35.7)
MCV RBC AUTO: 85.3 FL (ref 79–97)
MONOCYTES # BLD AUTO: 0.62 10*3/MM3 (ref 0.1–0.9)
MONOCYTES NFR BLD AUTO: 8 % (ref 5–12)
NEUTROPHILS # BLD AUTO: 6.27 10*3/MM3 (ref 1.7–7)
NEUTROPHILS NFR BLD AUTO: 81.3 % (ref 42.7–76)
NRBC BLD AUTO-RTO: 0 /100 WBC (ref 0–0.2)
PLATELET # BLD AUTO: 189 10*3/MM3 (ref 140–450)
PMV BLD AUTO: 12.3 FL (ref 6–12)
POTASSIUM BLD-SCNC: 4.1 MMOL/L (ref 3.5–5.2)
PROT SERPL-MCNC: 7.8 G/DL (ref 6–8.5)
RBC # BLD AUTO: 4.7 10*6/MM3 (ref 3.77–5.28)
SODIUM BLD-SCNC: 139 MMOL/L (ref 136–145)
TSH SERPL DL<=0.05 MIU/L-ACNC: 2.88 UIU/ML (ref 0.27–4.2)
VIT B12 BLD-MCNC: 351 PG/ML (ref 211–946)
WBC NRBC COR # BLD: 7.72 10*3/MM3 (ref 3.4–10.8)

## 2019-10-11 PROCEDURE — 84443 ASSAY THYROID STIM HORMONE: CPT | Performed by: NURSE PRACTITIONER

## 2019-10-11 PROCEDURE — 80053 COMPREHEN METABOLIC PANEL: CPT | Performed by: NURSE PRACTITIONER

## 2019-10-11 PROCEDURE — 82306 VITAMIN D 25 HYDROXY: CPT | Performed by: NURSE PRACTITIONER

## 2019-10-11 PROCEDURE — 82607 VITAMIN B-12: CPT | Performed by: NURSE PRACTITIONER

## 2019-10-11 PROCEDURE — 83036 HEMOGLOBIN GLYCOSYLATED A1C: CPT | Performed by: NURSE PRACTITIONER

## 2019-10-11 PROCEDURE — 85025 COMPLETE CBC W/AUTO DIFF WBC: CPT | Performed by: NURSE PRACTITIONER

## 2019-10-18 ENCOUNTER — OFFICE VISIT (OUTPATIENT)
Dept: ENDOCRINOLOGY | Facility: CLINIC | Age: 74
End: 2019-10-18

## 2019-10-18 VITALS
BODY MASS INDEX: 24.09 KG/M2 | WEIGHT: 153.5 LBS | SYSTOLIC BLOOD PRESSURE: 120 MMHG | OXYGEN SATURATION: 98 % | HEART RATE: 68 BPM | DIASTOLIC BLOOD PRESSURE: 82 MMHG | HEIGHT: 67 IN

## 2019-10-18 DIAGNOSIS — I10 ESSENTIAL HYPERTENSION: ICD-10-CM

## 2019-10-18 DIAGNOSIS — E55.9 VITAMIN D DEFICIENCY: ICD-10-CM

## 2019-10-18 DIAGNOSIS — E03.2 HYPOTHYROIDISM DUE TO MEDICATION: ICD-10-CM

## 2019-10-18 DIAGNOSIS — IMO0002 UNCONTROLLED TYPE 2 DIABETES MELLITUS WITH COMPLICATION, WITH LONG-TERM CURRENT USE OF INSULIN: Primary | ICD-10-CM

## 2019-10-18 DIAGNOSIS — E78.49 OTHER HYPERLIPIDEMIA: ICD-10-CM

## 2019-10-18 PROCEDURE — 99214 OFFICE O/P EST MOD 30 MIN: CPT | Performed by: NURSE PRACTITIONER

## 2019-10-18 RX ORDER — LEVOTHYROXINE SODIUM 0.03 MG/1
25 TABLET ORAL DAILY
Qty: 30 TABLET | Refills: 11 | Status: SHIPPED | OUTPATIENT
Start: 2019-10-18 | End: 2021-03-25 | Stop reason: DRUGHIGH

## 2019-10-18 NOTE — PROGRESS NOTES
Subjective    Jamilah Vee is a 74 y.o. female. she is here today for follow-up.    History of Present Illness    Reason - diabetes mellitus     Duration/Timing:  Diabetes mellitus type 2, Age at onset of diabetes: 61 years, Onset of symptoms gradual  timing - constant     quality - not controlled     severity - high     She is trying to do better      Severity (Complications/Hospitalizations)  Secondary Macrovascular Complications:  CAD   2 stents in May 2014  ICD - defibrillator  Secondary Microvascular Complications:  No Diabetic Nephropathy, No proteinuria, No Diabetic Retinopathy, No Diabetic Neuropathy     Context  Diabetes Regimen:  Insulin, Oral Medications      Lab Results   Component Value Date    HGBA1C 8.60 (H) 10/11/2019                   Blood Glucose Readings     States improving      This am 80         Diet  low carb intake  Exercise:  Does not exercise     Associated Signs/Symptoms  Hyperglycemic Symptoms:  No polyuria, No polydipsia, No polyphagia  Hypoglycemic Episodes:  No documented hypoglycemia  Modifying Factors/Comorbidities:  Hypertension, Hyperlipidemia                The following portions of the patient's history were reviewed and updated as appropriate:   Past Medical History:   Diagnosis Date   • Dyslipidemia    • Essential hypertension    • Hypercalcemia    • Osteopenia    • Type II diabetes mellitus, uncontrolled (CMS/[facility])    • Vitamin D deficiency      Past Surgical History:   Procedure Laterality Date   • PACEMAKER IMPLANTATION      Pacemaker AICD pocket (1)       Family History   Problem Relation Age of Onset   • Coronary artery disease Other    • Hypertension Other      OB History     No data available        Current Outpatient Medications   Medication Sig Dispense Refill   • albuterol sulfate  (90 Base) MCG/ACT inhaler 2 puffs morning, noon,4 o'clock and at bedtime for one week,then 4 times daily as needed. 18 g 3   • amiodarone (PACERONE) 200 MG tablet Take 200 mg by  mouth Daily.     • aspirin 81 MG tablet Take 81 mg by mouth daily.     • atorvastatin (LIPITOR) 40 MG tablet Take 40 mg by mouth daily.     • azithromycin (ZITHROMAX) 250 MG tablet Take 2 tablets the first day, then 1 tablet daily for 4 days. 6 tablet 0   • bumetanide (BUMEX) 1 MG tablet Take 2 mg by mouth Daily.     • calcium carbonate (OS-CARLOS) 600 MG tablet Take 600 mg by mouth Daily.     • clopidogrel (PLAVIX) 75 MG tablet Take 75 mg by mouth daily.     • hydrALAZINE (APRESOLINE) 50 MG tablet Take 50 mg by mouth 3 (Three) Times a Day.     • Insulin Pen Needle (PEN NEEDLES) 31G X 8 MM misc 4 (four) times a day. Use as indicated 4 times daily.     • isosorbide dinitrate (ISORDIL) 30 MG tablet Take 30 mg by mouth 3 (Three) Times a Day.     • LANTUS SOLOSTAR 100 UNIT/ML injection pen INJECT 50 UNITS SUBCUTANEOUSLY EVERY NIGHT (Patient taking differently: INJECT 30 UNITS SUBCUTANEOUSLY EVERY NIGHT) 5 pen 5   • levothyroxine (SYNTHROID, LEVOTHROID) 25 MCG tablet Take 1 tablet by mouth Daily. 30 tablet 11   • losartan (COZAAR) 25 MG tablet Take 25 mg by mouth As Needed (Takes when BP is over systolic is over 110.).     • metoprolol tartrate (LOPRESSOR) 50 MG tablet Take 25 mg by mouth Daily. One-half tablet daily     • NOVOLOG FLEXPEN 100 UNIT/ML solution pen-injector sc pen INJECT 10 TO 15 UNITS SUBCUTANEOUSLY THREE TIMES A DAY BEFORE MEALS 5 pen 5   • potassium chloride (KAYCIEL) 20 MEQ/15ML (10%) solution Take  by mouth Daily.     • spironolactone (ALDACTONE) 25 MG tablet Take 25 mg by mouth daily.       No current facility-administered medications for this visit.      No Known Allergies  Social History     Socioeconomic History   • Marital status: Unknown     Spouse name: Not on file   • Number of children: Not on file   • Years of education: Not on file   • Highest education level: Not on file   Tobacco Use   • Smoking status: Never Smoker   • Smokeless tobacco: Never Used   Substance and Sexual Activity   •  "Alcohol use: No       Review of Systems  Review of Systems   Constitutional: Negative for activity change, appetite change, diaphoresis and fatigue.   HENT: Negative for facial swelling, sneezing, sore throat, tinnitus, trouble swallowing and voice change.    Eyes: Negative for photophobia, pain, discharge, redness, itching and visual disturbance.   Respiratory: Negative for apnea, cough, choking, chest tightness and shortness of breath.    Cardiovascular: Negative for chest pain, palpitations and leg swelling.   Gastrointestinal: Negative for abdominal distention, abdominal pain, constipation, diarrhea, nausea and vomiting.   Endocrine: Negative for cold intolerance, heat intolerance, polydipsia, polyphagia and polyuria.   Genitourinary: Negative for difficulty urinating, dysuria, frequency, hematuria and urgency.   Musculoskeletal: Negative for arthralgias, back pain, gait problem, joint swelling, myalgias, neck pain and neck stiffness.   Skin: Negative for color change, pallor, rash and wound.   Neurological: Negative for dizziness, tremors, weakness, light-headedness, numbness and headaches.   Hematological: Negative for adenopathy. Does not bruise/bleed easily.   Psychiatric/Behavioral: Negative for behavioral problems, confusion and sleep disturbance.        Objective    /82   Pulse 68   Ht 168.9 cm (66.5\")   Wt 69.6 kg (153 lb 8 oz)   LMP  (LMP Unknown)   SpO2 98%   BMI 24.40 kg/m²   Physical Exam   Constitutional: She is oriented to person, place, and time. She appears well-developed and well-nourished. No distress.   HENT:   Head: Normocephalic and atraumatic.   Right Ear: External ear normal.   Left Ear: External ear normal.   Nose: Nose normal.   Eyes: Conjunctivae and EOM are normal. Pupils are equal, round, and reactive to light.   Neck: Normal range of motion. Neck supple. No tracheal deviation present. No thyromegaly present.   Cardiovascular: Normal rate, regular rhythm and normal heart " sounds.   No murmur heard.  Pulmonary/Chest: Effort normal and breath sounds normal. No respiratory distress. She has no wheezes.   Abdominal: Soft. Bowel sounds are normal. There is no tenderness. There is no rebound and no guarding.   Musculoskeletal: Normal range of motion. She exhibits no edema, tenderness or deformity.   Neurological: She is alert and oriented to person, place, and time. No cranial nerve deficit.   Skin: Skin is warm and dry. No rash noted.   Psychiatric: She has a normal mood and affect. Her behavior is normal. Judgment and thought content normal.       Lab Review  Glucose (mg/dL)   Date Value   10/11/2019 71   08/01/2019 237 (A)   06/14/2019 141 (H)   11/16/2018 125 (H)     Sodium (mmol/L)   Date Value   10/11/2019 139   08/01/2019 136   06/14/2019 141   11/16/2018 140     Potassium (mmol/L)   Date Value   10/11/2019 4.1   08/01/2019 4.9   06/14/2019 4.0   02/28/2019 4.1   11/16/2018 4.6     Chloride (mmol/L)   Date Value   10/11/2019 95 (L)   08/01/2019 101   06/14/2019 97 (L)   11/16/2018 96     CO2 (mmol/L)   Date Value   10/11/2019 30.4 (H)   06/14/2019 28.5   11/16/2018 30.0     Total CO2 (mmol/L)   Date Value   08/01/2019 30     BUN (mg/dL)   Date Value   10/11/2019 22   08/01/2019 24 (A)   06/14/2019 23   11/16/2018 25 (H)     Creatinine (mg/dL)   Date Value   10/11/2019 1.66 (H)   08/01/2019 1.47 (A)   06/14/2019 1.48 (H)   11/16/2018 1.57 (H)     Hemoglobin A1C (%)   Date Value   10/11/2019 8.60 (H)   06/14/2019 8.83 (H)   11/16/2018 9.9 (H)   08/16/2018 9.4 (H)   05/15/2018 9.4 (H)     Triglycerides (mg/dL)   Date Value   06/14/2019 119   11/16/2018 185   05/15/2018 179     LDL Cholesterol  (mg/dL)   Date Value   06/14/2019 64   11/16/2018 62   05/15/2018 58   11/14/2017 53       Assessment/Plan      1. Uncontrolled type 2 diabetes mellitus with complication, with long-term current use of insulin (CMS/[facility])    2. Hypothyroidism due to medication    3. Essential hypertension    4.  Other hyperlipidemia    5. Vitamin D deficiency    .    Medications prescribed:  Outpatient Encounter Medications as of 10/18/2019   Medication Sig Dispense Refill   • albuterol sulfate  (90 Base) MCG/ACT inhaler 2 puffs morning, noon,4 o'clock and at bedtime for one week,then 4 times daily as needed. 18 g 3   • amiodarone (PACERONE) 200 MG tablet Take 200 mg by mouth Daily.     • aspirin 81 MG tablet Take 81 mg by mouth daily.     • atorvastatin (LIPITOR) 40 MG tablet Take 40 mg by mouth daily.     • azithromycin (ZITHROMAX) 250 MG tablet Take 2 tablets the first day, then 1 tablet daily for 4 days. 6 tablet 0   • bumetanide (BUMEX) 1 MG tablet Take 2 mg by mouth Daily.     • calcium carbonate (OS-CARLOS) 600 MG tablet Take 600 mg by mouth Daily.     • clopidogrel (PLAVIX) 75 MG tablet Take 75 mg by mouth daily.     • hydrALAZINE (APRESOLINE) 50 MG tablet Take 50 mg by mouth 3 (Three) Times a Day.     • Insulin Pen Needle (PEN NEEDLES) 31G X 8 MM misc 4 (four) times a day. Use as indicated 4 times daily.     • isosorbide dinitrate (ISORDIL) 30 MG tablet Take 30 mg by mouth 3 (Three) Times a Day.     • LANTUS SOLOSTAR 100 UNIT/ML injection pen INJECT 50 UNITS SUBCUTANEOUSLY EVERY NIGHT (Patient taking differently: INJECT 30 UNITS SUBCUTANEOUSLY EVERY NIGHT) 5 pen 5   • levothyroxine (SYNTHROID, LEVOTHROID) 25 MCG tablet Take 1 tablet by mouth Daily. 30 tablet 11   • losartan (COZAAR) 25 MG tablet Take 25 mg by mouth As Needed (Takes when BP is over systolic is over 110.).     • metoprolol tartrate (LOPRESSOR) 50 MG tablet Take 25 mg by mouth Daily. One-half tablet daily     • NOVOLOG FLEXPEN 100 UNIT/ML solution pen-injector sc pen INJECT 10 TO 15 UNITS SUBCUTANEOUSLY THREE TIMES A DAY BEFORE MEALS 5 pen 5   • potassium chloride (KAYCIEL) 20 MEQ/15ML (10%) solution Take  by mouth Daily.     • spironolactone (ALDACTONE) 25 MG tablet Take 25 mg by mouth daily.     • [DISCONTINUED] levothyroxine (SYNTHROID,  LEVOTHROID) 25 MCG tablet Take 1 tablet by mouth Daily. 30 tablet 11     No facility-administered encounter medications on file as of 10/18/2019.        Orders placed during this encounter include:  Orders Placed This Encounter   Procedures   • Comprehensive Metabolic Panel   • Hemoglobin A1c   • Lipid Panel   • Protein / Creatinine Ratio, Urine - Urine, Clean Catch   • Microalbumin / Creatinine Urine Ratio - Urine, Clean Catch   • TSH   • Vitamin B12   • Vitamin D 25 Hydroxy   • CBC & Differential     Order Specific Question:   Manual Differential     Answer:   No     Glycemic Management     Lab Results   Component Value Date    HGBA1C 8.60 (H) 10/11/2019           does not always giving insulin         Lantus    38  units--- decrease to 36 units     She is waking up in the 70s         if you wake up with a sugar in the morning  less than 100 -- decrease by 5 units and don't raise it again      --------     Janumet 100/1000 mg with supper----stopped due to expensive      metformin 1000mg one at supper --not taking -- try taking 500 mg once at night -- stop due to GFR         ----------     stop glimepiride since not enough        -----------     Novolog  every meal   Not using --please give for meals     We discussed the consequences of not taking insulin and high blood sugars         Please give for your meals      151-200 : 2 units   201-250 : 4units  251-300 : 6units  above 300 : 7 units     +     3 units per 15 grams of CHO               please cover your nighttime snack         Lipid Management                    Component      Latest Ref Rng & Units 6/14/2019   Total Cholesterol      0 - 200 mg/dL 139   Triglycerides      0 - 150 mg/dL 119   HDL Cholesterol      40 - 60 mg/dL 51   LDL Cholesterol       0 - 100 mg/dL 64   VLDL Cholesterol      5 - 40 mg/dL 23.8   LDL/HDL Ratio       1.26       lipitor 40 mg one night      Blood Pressure Management           on metoprolol 50 bid  ---Decrease to 1/2 tablet po  BID  aldactone 25 mg qd - 1/2 tablet daily   losartan 25 mg qd  furosemide 40 mg qd--- stopped  Bumex 1 mg taking 2 tablets daily --- decreased to one daily         amiodarone 200 mg daily               Microvascular Complication Monitoring     eye exam -- les sthan one year     Component      Latest Ref Rng & Units 11/14/2017 11/14/2017      8:42 AM  8:42 AM   Protein/Creatinine Ratio, Urine      0.0 - 200.0 mg/G Crea 48.3     Creatinine, Urine      mg/dL 142.9 142.9   Total Protein, Urine      mg/dL 6.9     Microalbumin/Creatinine Ratio      0.0 - 30.0 mg/g       Microalbumin, Urine      mg/L   <0.6        Referral to nephrology       Bone Health     Component      Latest Ref Rng & Units 10/11/2019   25 Hydroxy, Vitamin D      30.0 - 100.0 ng/ml 26.8 (L)               taking otc daily   Other Diabetes Related Aspects                  Lab Results   Component Value Date     TSH 4.440 05/15/2018            Hypothyroidism due to medication          Component      Latest Ref Rng & Units 11/16/2018   TSH Baseline      0.460 - 4.680 mIU/mL 7.000 (H)   Free T4      0.78 - 2.19 ng/dL 1.88   T3, Total      97.0 - 169.0 ng/dl 117.0   Thyrotropin Receptor Antibody      0.00 - 1.75 IU/L <0.50   Thyroid Stimulating Immunoglobulin      0.00 - 0.55 IU/L <0.10   Thyroid Peroxidase Antibody      0 - 34 IU/mL 12      Appears to be amiodarone induced         25 mcg levothyroxine daily         Medication must me taken on an empty stomach at least one hour before food, drink and other medications. Only take with water. If taking vitamins or antiacids needs to be 4 hours before or after.        Lab Results   Component Value Date    TSH 2.880 10/11/2019         Lab Results   Component Value Date    LAKAODXY98 351 10/11/2019                  4. Follow-up: Return in about 3 months (around 1/18/2020) for Recheck.

## 2019-12-16 RX ORDER — INSULIN GLARGINE 100 [IU]/ML
INJECTION, SOLUTION SUBCUTANEOUS
Qty: 15 ML | Refills: 4 | Status: SHIPPED | OUTPATIENT
Start: 2019-12-16 | End: 2020-07-09

## 2020-03-20 ENCOUNTER — TELEPHONE (OUTPATIENT)
Dept: ENDOCRINOLOGY | Facility: CLINIC | Age: 75
End: 2020-03-20

## 2020-03-20 NOTE — TELEPHONE ENCOUNTER
Pt needs to know if she can see her labs done by [facility] and let her know if medicine needs any changes ..[phone]    Thank You

## 2020-03-23 DIAGNOSIS — E55.9 VITAMIN D DEFICIENCY: Primary | ICD-10-CM

## 2020-03-23 DIAGNOSIS — IMO0002 UNCONTROLLED TYPE 2 DIABETES MELLITUS WITH COMPLICATION, WITH LONG-TERM CURRENT USE OF INSULIN: ICD-10-CM

## 2020-03-23 DIAGNOSIS — E03.2 HYPOTHYROIDISM DUE TO MEDICATION: ICD-10-CM

## 2020-03-23 DIAGNOSIS — E78.49 OTHER HYPERLIPIDEMIA: ICD-10-CM

## 2020-03-23 DIAGNOSIS — I10 ESSENTIAL HYPERTENSION: ICD-10-CM

## 2020-06-01 ENCOUNTER — LAB (OUTPATIENT)
Dept: LAB | Facility: HOSPITAL | Age: 75
End: 2020-06-01

## 2020-06-01 DIAGNOSIS — I10 ESSENTIAL HYPERTENSION: ICD-10-CM

## 2020-06-01 DIAGNOSIS — IMO0002 UNCONTROLLED TYPE 2 DIABETES MELLITUS WITH COMPLICATION, WITH LONG-TERM CURRENT USE OF INSULIN: ICD-10-CM

## 2020-06-01 DIAGNOSIS — E55.9 VITAMIN D DEFICIENCY: ICD-10-CM

## 2020-06-01 DIAGNOSIS — E03.2 HYPOTHYROIDISM DUE TO MEDICATION: ICD-10-CM

## 2020-06-01 DIAGNOSIS — E78.49 OTHER HYPERLIPIDEMIA: ICD-10-CM

## 2020-06-01 LAB
25(OH)D3 SERPL-MCNC: 31.9 NG/ML (ref 30–100)
ALBUMIN SERPL-MCNC: 4.1 G/DL (ref 3.5–5.2)
ALBUMIN/GLOB SERPL: 1.1 G/DL
ALP SERPL-CCNC: 66 U/L (ref 39–117)
ALT SERPL W P-5'-P-CCNC: 23 U/L (ref 1–33)
ANION GAP SERPL CALCULATED.3IONS-SCNC: 11.3 MMOL/L (ref 5–15)
AST SERPL-CCNC: 31 U/L (ref 1–32)
BASOPHILS # BLD AUTO: 0.08 10*3/MM3 (ref 0–0.2)
BASOPHILS NFR BLD AUTO: 2.1 % (ref 0–1.5)
BILIRUB SERPL-MCNC: 1.7 MG/DL (ref 0.2–1.2)
BUN BLD-MCNC: 37 MG/DL (ref 8–23)
BUN/CREAT SERPL: 19.1 (ref 7–25)
CALCIUM SPEC-SCNC: 10.7 MG/DL (ref 8.6–10.5)
CHLORIDE SERPL-SCNC: 89 MMOL/L (ref 98–107)
CHOLEST SERPL-MCNC: 113 MG/DL (ref 0–200)
CO2 SERPL-SCNC: 31.7 MMOL/L (ref 22–29)
CREAT BLD-MCNC: 1.94 MG/DL (ref 0.57–1)
DEPRECATED RDW RBC AUTO: 53.1 FL (ref 37–54)
EOSINOPHIL # BLD AUTO: 0.06 10*3/MM3 (ref 0–0.4)
EOSINOPHIL NFR BLD AUTO: 1.5 % (ref 0.3–6.2)
ERYTHROCYTE [DISTWIDTH] IN BLOOD BY AUTOMATED COUNT: 18.9 % (ref 12.3–15.4)
GFR SERPL CREATININE-BSD FRML MDRD: 25 ML/MIN/1.73
GLOBULIN UR ELPH-MCNC: 3.6 GM/DL
GLUCOSE BLD-MCNC: 84 MG/DL (ref 65–99)
HBA1C MFR BLD: 9.93 % (ref 4.8–5.6)
HCT VFR BLD AUTO: 45.7 % (ref 34–46.6)
HDLC SERPL-MCNC: 48 MG/DL (ref 40–60)
HGB BLD-MCNC: 14.6 G/DL (ref 12–15.9)
IMM GRANULOCYTES # BLD AUTO: 0.01 10*3/MM3 (ref 0–0.05)
IMM GRANULOCYTES NFR BLD AUTO: 0.3 % (ref 0–0.5)
LDLC SERPL CALC-MCNC: 46 MG/DL (ref 0–100)
LDLC/HDLC SERPL: 0.95 {RATIO}
LYMPHOCYTES # BLD AUTO: 0.91 10*3/MM3 (ref 0.7–3.1)
LYMPHOCYTES NFR BLD AUTO: 23.3 % (ref 19.6–45.3)
MCH RBC QN AUTO: 26.1 PG (ref 26.6–33)
MCHC RBC AUTO-ENTMCNC: 31.9 G/DL (ref 31.5–35.7)
MCV RBC AUTO: 81.6 FL (ref 79–97)
MONOCYTES # BLD AUTO: 0.54 10*3/MM3 (ref 0.1–0.9)
MONOCYTES NFR BLD AUTO: 13.8 % (ref 5–12)
NEUTROPHILS # BLD AUTO: 2.3 10*3/MM3 (ref 1.7–7)
NEUTROPHILS NFR BLD AUTO: 59 % (ref 42.7–76)
NRBC BLD AUTO-RTO: 0 /100 WBC (ref 0–0.2)
PLATELET # BLD AUTO: 189 10*3/MM3 (ref 140–450)
PMV BLD AUTO: 12.2 FL (ref 6–12)
POTASSIUM BLD-SCNC: 4 MMOL/L (ref 3.5–5.2)
PROT SERPL-MCNC: 7.7 G/DL (ref 6–8.5)
RBC # BLD AUTO: 5.6 10*6/MM3 (ref 3.77–5.28)
SODIUM BLD-SCNC: 132 MMOL/L (ref 136–145)
TRIGL SERPL-MCNC: 97 MG/DL (ref 0–150)
TSH SERPL DL<=0.05 MIU/L-ACNC: 16.5 UIU/ML (ref 0.27–4.2)
VIT B12 BLD-MCNC: 725 PG/ML (ref 211–946)
VLDLC SERPL-MCNC: 19.4 MG/DL (ref 5–40)
WBC NRBC COR # BLD: 3.9 10*3/MM3 (ref 3.4–10.8)

## 2020-06-01 PROCEDURE — 80061 LIPID PANEL: CPT

## 2020-06-01 PROCEDURE — 82607 VITAMIN B-12: CPT

## 2020-06-01 PROCEDURE — 85025 COMPLETE CBC W/AUTO DIFF WBC: CPT

## 2020-06-01 PROCEDURE — 83036 HEMOGLOBIN GLYCOSYLATED A1C: CPT

## 2020-06-01 PROCEDURE — 82306 VITAMIN D 25 HYDROXY: CPT

## 2020-06-01 PROCEDURE — 80053 COMPREHEN METABOLIC PANEL: CPT

## 2020-06-01 PROCEDURE — 84443 ASSAY THYROID STIM HORMONE: CPT

## 2020-06-08 ENCOUNTER — OFFICE VISIT (OUTPATIENT)
Dept: ENDOCRINOLOGY | Facility: CLINIC | Age: 75
End: 2020-06-08

## 2020-06-08 VITALS
SYSTOLIC BLOOD PRESSURE: 110 MMHG | DIASTOLIC BLOOD PRESSURE: 72 MMHG | BODY MASS INDEX: 23.78 KG/M2 | OXYGEN SATURATION: 97 % | WEIGHT: 151.5 LBS | HEART RATE: 76 BPM | HEIGHT: 67 IN

## 2020-06-08 DIAGNOSIS — IMO0002 UNCONTROLLED TYPE 2 DIABETES MELLITUS WITH COMPLICATION, WITH LONG-TERM CURRENT USE OF INSULIN: ICD-10-CM

## 2020-06-08 DIAGNOSIS — E55.9 VITAMIN D DEFICIENCY: ICD-10-CM

## 2020-06-08 DIAGNOSIS — N18.4 CKD (CHRONIC KIDNEY DISEASE) STAGE 4, GFR 15-29 ML/MIN (HCC): Primary | ICD-10-CM

## 2020-06-08 DIAGNOSIS — E78.49 OTHER HYPERLIPIDEMIA: ICD-10-CM

## 2020-06-08 DIAGNOSIS — I10 ESSENTIAL HYPERTENSION: ICD-10-CM

## 2020-06-08 DIAGNOSIS — E03.2 HYPOTHYROIDISM DUE TO MEDICATION: ICD-10-CM

## 2020-06-08 PROCEDURE — 99214 OFFICE O/P EST MOD 30 MIN: CPT | Performed by: NURSE PRACTITIONER

## 2020-06-08 RX ORDER — MIRTAZAPINE 15 MG/1
15 TABLET, FILM COATED ORAL NIGHTLY
COMMUNITY
End: 2021-03-25 | Stop reason: ALTCHOICE

## 2020-06-08 RX ORDER — DIPHENOXYLATE HYDROCHLORIDE AND ATROPINE SULFATE 2.5; .025 MG/1; MG/1
TABLET ORAL DAILY
COMMUNITY

## 2020-06-08 RX ORDER — LEVOTHYROXINE SODIUM 0.05 MG/1
50 TABLET ORAL DAILY
Qty: 30 TABLET | Refills: 11 | Status: SHIPPED | OUTPATIENT
Start: 2020-06-08 | End: 2020-12-31

## 2020-06-08 NOTE — PROGRESS NOTES
Subjective    Jamilah Vee is a 75 y.o. female. she is here today for follow-up.    History of Present Illness     IN OFFICE VISIT     Reason - diabetes mellitus      Duration/Timing:  Diabetes mellitus type 2, Age at onset of diabetes: 61 years, Onset of symptoms gradual  timing - constant     quality - not controlled     severity - high        Severity (Complications/Hospitalizations)  Secondary Macrovascular Complications:  CAD   2 stents in May 2014  ICD - defibrillator  Secondary Microvascular Complications:  No Diabetic Nephropathy, No proteinuria, No Diabetic Retinopathy, No Diabetic Neuropathy     Context  Diabetes Regimen:  Insulin, Oral Medications     Lab Results   Component Value Date    HGBA1C 9.93 (H) 06/01/2020                         Blood Glucose Readings        States 100 to 250     Am at goal     High during the day     No lows        Diet      Eating sweets         Exercise:  Does not exercise     Associated Signs/Symptoms  Hyperglycemic Symptoms:  No polyuria, No polydipsia, No polyphagia  Hypoglycemic Episodes:  No documented hypoglycemia  Modifying Factors/Comorbidities:  Hypertension, Hyperlipidemia                     The following portions of the patient's history were reviewed and updated as appropriate:   Past Medical History:   Diagnosis Date   • Dyslipidemia    • Essential hypertension    • Hypercalcemia    • Osteopenia    • Type II diabetes mellitus, uncontrolled (CMS/[facility])    • Vitamin D deficiency      Past Surgical History:   Procedure Laterality Date   • PACEMAKER IMPLANTATION      Pacemaker AICD pocket (1)       Family History   Problem Relation Age of Onset   • Coronary artery disease Other    • Hypertension Other      OB History    None       Current Outpatient Medications   Medication Sig Dispense Refill   • amiodarone (PACERONE) 200 MG tablet Take 200 mg by mouth Daily.     • aspirin 81 MG tablet Take 81 mg by mouth daily.     • atorvastatin (LIPITOR) 40 MG tablet Take 40  mg by mouth daily.     • bumetanide (BUMEX) 1 MG tablet Take 2 mg by mouth Daily.     • calcium carbonate (OS-CARLOS) 600 MG tablet Take 600 mg by mouth Daily.     • insulin aspart (NovoLOG FlexPen) 100 UNIT/ML solution pen-injector sc pen Inject 15 Units under the skin into the appropriate area as directed 3 (Three) Times a Day With Meals. 5 pen 11   • LANTUS SOLOSTAR 100 UNIT/ML injection pen INJECT 50 UNITS SUBCUTANEOUSLY EVERY NIGHT 15 mL 4   • levothyroxine (SYNTHROID, LEVOTHROID) 25 MCG tablet Take 1 tablet by mouth Daily. 30 tablet 11   • losartan (COZAAR) 25 MG tablet Take 25 mg by mouth As Needed (Takes when BP is over systolic is over 110.).     • metoprolol tartrate (LOPRESSOR) 50 MG tablet Take 25 mg by mouth Daily. One-half tablet daily     • mirtazapine (REMERON) 7.5 MG half tablet Take 15 mg by mouth Every Night.     • multivitamin (THERAGRAN) tablet tablet Take  by mouth Daily.     • potassium chloride (KAYCIEL) 20 MEQ/15ML (10%) solution Take  by mouth Daily.     • spironolactone (ALDACTONE) 25 MG tablet Take 25 mg by mouth daily.     • albuterol sulfate  (90 Base) MCG/ACT inhaler 2 puffs morning, noon,4 o'clock and at bedtime for one week,then 4 times daily as needed. 18 g 3   • azithromycin (ZITHROMAX) 250 MG tablet Take 2 tablets the first day, then 1 tablet daily for 4 days. 6 tablet 0   • clopidogrel (PLAVIX) 75 MG tablet Take 75 mg by mouth daily.     • hydrALAZINE (APRESOLINE) 50 MG tablet Take 50 mg by mouth 3 (Three) Times a Day.     • Insulin Pen Needle (PEN NEEDLES) 31G X 8 MM misc 4 (four) times a day. Use as indicated 4 times daily.     • isosorbide dinitrate (ISORDIL) 30 MG tablet Take 30 mg by mouth 3 (Three) Times a Day.     • levothyroxine (Synthroid) 50 MCG tablet Take 1 tablet by mouth Daily. 30 tablet 11     No current facility-administered medications for this visit.      No Known Allergies  Social History     Socioeconomic History   • Marital status: Unknown     Spouse  "name: Not on file   • Number of children: Not on file   • Years of education: Not on file   • Highest education level: Not on file   Tobacco Use   • Smoking status: Never Smoker   • Smokeless tobacco: Never Used   Substance and Sexual Activity   • Alcohol use: No       Review of Systems  Review of Systems   Constitutional: Negative for activity change, appetite change, diaphoresis and fatigue.   HENT: Negative for facial swelling, sneezing, sore throat, tinnitus, trouble swallowing and voice change.    Eyes: Negative for photophobia, pain, discharge, redness, itching and visual disturbance.   Respiratory: Negative for apnea, cough, choking, chest tightness and shortness of breath.    Cardiovascular: Negative for chest pain, palpitations and leg swelling.   Gastrointestinal: Negative for abdominal distention, abdominal pain, constipation, diarrhea, nausea and vomiting.   Endocrine: Negative for cold intolerance, heat intolerance, polydipsia, polyphagia and polyuria.   Genitourinary: Negative for difficulty urinating, dysuria, frequency, hematuria and urgency.   Musculoskeletal: Negative for arthralgias, back pain, gait problem, joint swelling, myalgias, neck pain and neck stiffness.   Skin: Negative for color change, pallor, rash and wound.   Neurological: Negative for dizziness, tremors, weakness, light-headedness, numbness and headaches.   Hematological: Negative for adenopathy. Does not bruise/bleed easily.   Psychiatric/Behavioral: Negative for behavioral problems, confusion and sleep disturbance.        Objective    /72   Pulse 76   Ht 168.9 cm (66.5\")   Wt 68.7 kg (151 lb 8 oz)   LMP  (LMP Unknown)   SpO2 97%   BMI 24.09 kg/m²   Physical Exam   Constitutional: She is oriented to person, place, and time. She appears well-developed and well-nourished. No distress.   HENT:   Head: Normocephalic and atraumatic.   Right Ear: External ear normal.   Left Ear: External ear normal.   Nose: Nose normal. "   Eyes: Pupils are equal, round, and reactive to light. Conjunctivae and EOM are normal.   Neck: Normal range of motion. Neck supple. No tracheal deviation present. No thyromegaly present.   Cardiovascular: Normal rate, regular rhythm and normal heart sounds.   No murmur heard.  Pulmonary/Chest: Effort normal and breath sounds normal. No respiratory distress. She has no wheezes.   Abdominal: Soft. Bowel sounds are normal. There is no tenderness. There is no rebound and no guarding.   Musculoskeletal: Normal range of motion. She exhibits no edema, tenderness or deformity.      During the foot exam she had a monofilament test performed.    Neurological Sensory Findings -  Unaltered sharp/dull right ankle/foot discrimination and unaltered sharp/dull left ankle/foot discrimination. No right ankle/foot altered proprioception and no left ankle/foot altered proprioception  Vascular Status -  Her right foot exhibits no edema. Her left foot exhibits no edema.  Skin Integrity  -  She has right heel is dry and cracked.She has left heel dry and cracked..  Neurological: She is alert and oriented to person, place, and time. No cranial nerve deficit.   Skin: Skin is warm and dry. No rash noted.   Psychiatric: She has a normal mood and affect. Her behavior is normal. Judgment and thought content normal.       Lab Review  Glucose (mg/dL)   Date Value   06/01/2020 84   10/11/2019 71   08/01/2019 237 (A)   06/14/2019 141 (H)   11/12/2018 173 (A)     Sodium (mmol/L)   Date Value   06/01/2020 132 (L)   10/11/2019 139   08/01/2019 136   06/14/2019 141   11/12/2018 136     Potassium (mmol/L)   Date Value   06/01/2020 4.0   10/11/2019 4.1   08/01/2019 4.9   06/14/2019 4.0   02/28/2019 4.1   11/12/2018 4.4     Chloride (mmol/L)   Date Value   06/01/2020 89 (L)   10/11/2019 95 (L)   08/01/2019 101   06/14/2019 97 (L)   11/12/2018 99     CO2 (mmol/L)   Date Value   06/01/2020 31.7 (H)   10/11/2019 30.4 (H)   06/14/2019 28.5     Total CO2  (mmol/L)   Date Value   08/01/2019 30   11/12/2018 30     BUN (mg/dL)   Date Value   06/01/2020 37 (H)   10/11/2019 22   08/01/2019 24 (A)   06/14/2019 23   11/12/2018 15     Creatinine (mg/dL)   Date Value   06/01/2020 1.94 (H)   10/11/2019 1.66 (H)   08/01/2019 1.47 (A)   06/14/2019 1.48 (H)   11/12/2018 1.64 (A)     Hemoglobin A1C (%)   Date Value   06/01/2020 9.93 (H)   01/17/2020 8.1   11/21/2019 9.5   10/11/2019 8.60 (H)   06/14/2019 8.83 (H)   02/27/2019 7.2     Triglycerides (mg/dL)   Date Value   06/01/2020 97   01/17/2020 116   06/14/2019 119   11/16/2018 185     LDL Cholesterol  (mg/dL)   Date Value   06/01/2020 46   01/17/2020 29   06/14/2019 64   11/16/2018 62   05/15/2018 58   11/14/2017 53       Assessment/Plan      1. CKD (chronic kidney disease) stage 4, GFR 15-29 ml/min (CMS/[facility])    2. Essential hypertension    3. Other hyperlipidemia    4. Vitamin D deficiency    5. Uncontrolled type 2 diabetes mellitus with complication, with long-term current use of insulin (CMS/[facility])    6. Hypothyroidism due to medication    .    Medications prescribed:  Outpatient Encounter Medications as of 6/8/2020   Medication Sig Dispense Refill   • amiodarone (PACERONE) 200 MG tablet Take 200 mg by mouth Daily.     • aspirin 81 MG tablet Take 81 mg by mouth daily.     • atorvastatin (LIPITOR) 40 MG tablet Take 40 mg by mouth daily.     • bumetanide (BUMEX) 1 MG tablet Take 2 mg by mouth Daily.     • calcium carbonate (OS-CARLOS) 600 MG tablet Take 600 mg by mouth Daily.     • insulin aspart (NovoLOG FlexPen) 100 UNIT/ML solution pen-injector sc pen Inject 15 Units under the skin into the appropriate area as directed 3 (Three) Times a Day With Meals. 5 pen 11   • LANTUS SOLOSTAR 100 UNIT/ML injection pen INJECT 50 UNITS SUBCUTANEOUSLY EVERY NIGHT 15 mL 4   • levothyroxine (SYNTHROID, LEVOTHROID) 25 MCG tablet Take 1 tablet by mouth Daily. 30 tablet 11   • losartan (COZAAR) 25 MG tablet Take 25 mg by mouth As Needed (Takes when  BP is over systolic is over 110.).     • metoprolol tartrate (LOPRESSOR) 50 MG tablet Take 25 mg by mouth Daily. One-half tablet daily     • mirtazapine (REMERON) 7.5 MG half tablet Take 15 mg by mouth Every Night.     • multivitamin (THERAGRAN) tablet tablet Take  by mouth Daily.     • potassium chloride (KAYCIEL) 20 MEQ/15ML (10%) solution Take  by mouth Daily.     • spironolactone (ALDACTONE) 25 MG tablet Take 25 mg by mouth daily.     • [DISCONTINUED] NOVOLOG FLEXPEN 100 UNIT/ML solution pen-injector sc pen INJECT 10 TO 15 UNITS SUBCUTANEOUSLY THREE TIMES A DAY BEFORE MEALS 5 pen 5   • albuterol sulfate  (90 Base) MCG/ACT inhaler 2 puffs morning, noon,4 o'clock and at bedtime for one week,then 4 times daily as needed. 18 g 3   • azithromycin (ZITHROMAX) 250 MG tablet Take 2 tablets the first day, then 1 tablet daily for 4 days. 6 tablet 0   • clopidogrel (PLAVIX) 75 MG tablet Take 75 mg by mouth daily.     • hydrALAZINE (APRESOLINE) 50 MG tablet Take 50 mg by mouth 3 (Three) Times a Day.     • Insulin Pen Needle (PEN NEEDLES) 31G X 8 MM misc 4 (four) times a day. Use as indicated 4 times daily.     • isosorbide dinitrate (ISORDIL) 30 MG tablet Take 30 mg by mouth 3 (Three) Times a Day.     • levothyroxine (Synthroid) 50 MCG tablet Take 1 tablet by mouth Daily. 30 tablet 11     No facility-administered encounter medications on file as of 6/8/2020.        Orders placed during this encounter include:  Orders Placed This Encounter   Procedures   • Comprehensive Metabolic Panel   • Hemoglobin A1c   • Lipid Panel   • Protein / Creatinine Ratio, Urine - Urine, Clean Catch   • Microalbumin / Creatinine Urine Ratio - Urine, Clean Catch   • TSH   • Vitamin B12   • Vitamin D 25 Hydroxy   • Ambulatory Referral to Nephrology     Referral Priority:   Routine     Referral Type:   Consultation     Referral Reason:   Specialty Services Required     Referred to Provider:   Syd Hamilton MD     Requested Specialty:    Nephrology     Number of Visits Requested:   1   • CBC & Differential     Order Specific Question:   Manual Differential     Answer:   No     Glycemic Management      Lab Results   Component Value Date    HGBA1C 9.93 (H) 06/01/2020                 does not always giving insulin         Lantus taking  36 units      Her fasting sugar was 94 so no change         if you wake up with a sugar in the morning  less than 100 -- decrease by 5 units and don't raise it again      --------     Janumet 100/1000 mg with supper----stopped due to expensive      metformin 1000mg  stop due to GFR         ----------     stop glimepiride since not enough        -----------     Novolog     Gives 5 units --- increase to 8 units      151-200 : 2 units   201-250 : 4units  251-300 : 6units  above 300 : 7 units     +             Lipid Management              Component      Latest Ref Rng & Units 6/1/2020   Total Cholesterol      0 - 200 mg/dL 113   Triglycerides      0 - 150 mg/dL 97   HDL Cholesterol      40 - 60 mg/dL 48   LDL Cholesterol      0 - 100 mg/dL 46   VLDL Cholesterol      5 - 40 mg/dL 19.4   LDL/HDL Ratio       0.95           lipitor 40 mg one night      Blood Pressure Management           on metoprolol 501/2 tablet po BID  aldactone 25 mg qd - 1/2 tablet daily   losartan 25 mg qd  furosemide 40 mg qd--- stopped  Bumex 1 mg  one daily         amiodarone 200 mg daily               Microvascular Complication Monitoring     eye exam -- August 2019 , no DR       Neuropathy -- none           Referral to nephrology --- DID NOT GO      PUT IN new referral today                   [facility]           Component      Latest Ref Rng & Units 6/1/2020   25 Hydroxy, Vitamin D      30.0 - 100.0 ng/ml 31.9               taking otc daily         Other Diabetes Related Aspects                  Lab Results   Component Value Date     TSH 4.440 05/15/2018            Hypothyroidism due to medication          Component      Latest Ref Rng & Units  11/16/2018   TSH Baseline      0.460 - 4.680 mIU/mL 7.000 (H)   Free T4      0.78 - 2.19 ng/dL 1.88   T3, Total      97.0 - 169.0 ng/dl 117.0   Thyrotropin Receptor Antibody      0.00 - 1.75 IU/L <0.50   Thyroid Stimulating Immunoglobulin      0.00 - 0.55 IU/L <0.10   Thyroid Peroxidase Antibody      0 - 34 IU/mL 12      Appears to be amiodarone induced         25 mcg levothyroxine daily         Medication must me taken on an empty stomach at least one hour before food, drink and other medications. Only take with water. If taking vitamins or antiacids needs to be 4 hours before or after.                Lab Results   Component Value Date    TSH 16.500 (H) 06/01/2020     INCREASE TO LEVOTHYROXINE 50 MCG DAILY       4. Follow-up: Return in about 3 months (around 9/8/2020) for Recheck.

## 2020-07-09 RX ORDER — INSULIN GLARGINE 100 [IU]/ML
INJECTION, SOLUTION SUBCUTANEOUS
Qty: 5 PEN | Refills: 3 | Status: SHIPPED | OUTPATIENT
Start: 2020-07-09 | End: 2021-03-16 | Stop reason: SDUPTHER

## 2020-08-10 ENCOUNTER — TELEPHONE (OUTPATIENT)
Dept: ENDOCRINOLOGY | Facility: CLINIC | Age: 75
End: 2020-08-10

## 2020-08-19 ENCOUNTER — TRANSCRIBE ORDERS (OUTPATIENT)
Dept: LAB | Facility: HOSPITAL | Age: 75
End: 2020-08-19

## 2020-08-19 DIAGNOSIS — N18.4 CHRONIC KIDNEY DISEASE, STAGE IV (SEVERE) (HCC): Primary | ICD-10-CM

## 2020-09-03 ENCOUNTER — TRANSCRIBE ORDERS (OUTPATIENT)
Dept: LAB | Facility: HOSPITAL | Age: 75
End: 2020-09-03

## 2020-09-03 ENCOUNTER — LAB (OUTPATIENT)
Dept: LAB | Facility: HOSPITAL | Age: 75
End: 2020-09-03

## 2020-09-03 DIAGNOSIS — E03.2 HYPOTHYROIDISM DUE TO MEDICATION: ICD-10-CM

## 2020-09-03 DIAGNOSIS — I10 ESSENTIAL HYPERTENSION: ICD-10-CM

## 2020-09-03 DIAGNOSIS — N18.4 CHRONIC KIDNEY DISEASE, STAGE IV (SEVERE) (HCC): Primary | ICD-10-CM

## 2020-09-03 DIAGNOSIS — E78.49 OTHER HYPERLIPIDEMIA: ICD-10-CM

## 2020-09-03 DIAGNOSIS — E55.9 VITAMIN D DEFICIENCY: ICD-10-CM

## 2020-09-03 DIAGNOSIS — N18.4 CHRONIC KIDNEY DISEASE, STAGE IV (SEVERE) (HCC): ICD-10-CM

## 2020-09-03 DIAGNOSIS — IMO0002 UNCONTROLLED TYPE 2 DIABETES MELLITUS WITH COMPLICATION, WITH LONG-TERM CURRENT USE OF INSULIN: ICD-10-CM

## 2020-09-03 LAB
25(OH)D3 SERPL-MCNC: 31.8 NG/ML (ref 30–100)
ALBUMIN SERPL-MCNC: 4.2 G/DL (ref 3.5–5.2)
ALBUMIN UR-MCNC: 2.1 MG/DL
ALBUMIN/GLOB SERPL: 1.3 G/DL
ALP SERPL-CCNC: 68 U/L (ref 39–117)
ALT SERPL W P-5'-P-CCNC: 31 U/L (ref 1–33)
ANION GAP SERPL CALCULATED.3IONS-SCNC: 12.7 MMOL/L (ref 5–15)
AST SERPL-CCNC: 39 U/L (ref 1–32)
BASOPHILS # BLD AUTO: 0.07 10*3/MM3 (ref 0–0.2)
BASOPHILS NFR BLD AUTO: 1 % (ref 0–1.5)
BILIRUB SERPL-MCNC: 1 MG/DL (ref 0–1.2)
BUN SERPL-MCNC: 26 MG/DL (ref 8–23)
BUN/CREAT SERPL: 17.1 (ref 7–25)
CALCIUM SPEC-SCNC: 10.3 MG/DL (ref 8.6–10.5)
CHLORIDE SERPL-SCNC: 99 MMOL/L (ref 98–107)
CHOLEST SERPL-MCNC: 120 MG/DL (ref 0–200)
CO2 SERPL-SCNC: 24.3 MMOL/L (ref 22–29)
CREAT SERPL-MCNC: 1.52 MG/DL (ref 0.57–1)
CREAT UR-MCNC: 231.5 MG/DL
CREAT UR-MCNC: 231.5 MG/DL
DEPRECATED RDW RBC AUTO: 44.1 FL (ref 37–54)
EOSINOPHIL # BLD AUTO: 0.12 10*3/MM3 (ref 0–0.4)
EOSINOPHIL NFR BLD AUTO: 1.8 % (ref 0.3–6.2)
ERYTHROCYTE [DISTWIDTH] IN BLOOD BY AUTOMATED COUNT: 14.2 % (ref 12.3–15.4)
GFR SERPL CREATININE-BSD FRML MDRD: 33 ML/MIN/1.73
GLOBULIN UR ELPH-MCNC: 3.3 GM/DL
GLUCOSE SERPL-MCNC: 109 MG/DL (ref 65–99)
HBA1C MFR BLD: 9.5 % (ref 4.8–5.6)
HCT VFR BLD AUTO: 43.1 % (ref 34–46.6)
HDLC SERPL-MCNC: 41 MG/DL (ref 40–60)
HGB BLD-MCNC: 14.4 G/DL (ref 12–15.9)
LDLC SERPL CALC-MCNC: 53 MG/DL (ref 0–100)
LDLC/HDLC SERPL: 1.3 {RATIO}
LYMPHOCYTES # BLD AUTO: 1.63 10*3/MM3 (ref 0.7–3.1)
LYMPHOCYTES NFR BLD AUTO: 24.2 % (ref 19.6–45.3)
MCH RBC QN AUTO: 28.9 PG (ref 26.6–33)
MCHC RBC AUTO-ENTMCNC: 33.4 G/DL (ref 31.5–35.7)
MCV RBC AUTO: 86.4 FL (ref 79–97)
MICROALBUMIN/CREAT UR: 9.1 MG/G
MONOCYTES # BLD AUTO: 0.78 10*3/MM3 (ref 0.1–0.9)
MONOCYTES NFR BLD AUTO: 11.6 % (ref 5–12)
NEUTROPHILS NFR BLD AUTO: 4.11 10*3/MM3 (ref 1.7–7)
NEUTROPHILS NFR BLD AUTO: 61.1 % (ref 42.7–76)
PHOSPHATE SERPL-MCNC: 3.7 MG/DL (ref 2.5–4.5)
PLATELET # BLD AUTO: 172 10*3/MM3 (ref 140–450)
PMV BLD AUTO: 13.5 FL (ref 6–12)
POTASSIUM SERPL-SCNC: 4.7 MMOL/L (ref 3.5–5.2)
PROT SERPL-MCNC: 7.5 G/DL (ref 6–8.5)
PROT UR-MCNC: 60 MG/DL
PROT/CREAT UR: 259.2 MG/G CREA (ref 0–200)
RBC # BLD AUTO: 4.99 10*6/MM3 (ref 3.77–5.28)
SODIUM SERPL-SCNC: 136 MMOL/L (ref 136–145)
TRIGL SERPL-MCNC: 128 MG/DL (ref 0–150)
TSH SERPL DL<=0.05 MIU/L-ACNC: 12.4 UIU/ML (ref 0.27–4.2)
VIT B12 BLD-MCNC: 423 PG/ML (ref 211–946)
VLDLC SERPL-MCNC: 25.6 MG/DL (ref 5–40)
WBC # BLD AUTO: 6.73 10*3/MM3 (ref 3.4–10.8)

## 2020-09-03 PROCEDURE — 80061 LIPID PANEL: CPT | Performed by: NURSE PRACTITIONER

## 2020-09-03 PROCEDURE — 82607 VITAMIN B-12: CPT | Performed by: NURSE PRACTITIONER

## 2020-09-03 PROCEDURE — 82043 UR ALBUMIN QUANTITATIVE: CPT | Performed by: NURSE PRACTITIONER

## 2020-09-03 PROCEDURE — 82570 ASSAY OF URINE CREATININE: CPT | Performed by: NURSE PRACTITIONER

## 2020-09-03 PROCEDURE — 80053 COMPREHEN METABOLIC PANEL: CPT | Performed by: NURSE PRACTITIONER

## 2020-09-03 PROCEDURE — 85025 COMPLETE CBC W/AUTO DIFF WBC: CPT | Performed by: NURSE PRACTITIONER

## 2020-09-03 PROCEDURE — 84443 ASSAY THYROID STIM HORMONE: CPT | Performed by: NURSE PRACTITIONER

## 2020-09-03 PROCEDURE — 82306 VITAMIN D 25 HYDROXY: CPT | Performed by: NURSE PRACTITIONER

## 2020-09-03 PROCEDURE — 84156 ASSAY OF PROTEIN URINE: CPT | Performed by: NURSE PRACTITIONER

## 2020-09-03 PROCEDURE — 84100 ASSAY OF PHOSPHORUS: CPT

## 2020-09-03 PROCEDURE — 83036 HEMOGLOBIN GLYCOSYLATED A1C: CPT | Performed by: NURSE PRACTITIONER

## 2020-09-08 ENCOUNTER — LAB (OUTPATIENT)
Dept: LAB | Facility: HOSPITAL | Age: 75
End: 2020-09-08

## 2020-09-08 DIAGNOSIS — N18.4 CHRONIC KIDNEY DISEASE, STAGE IV (SEVERE) (HCC): ICD-10-CM

## 2020-09-08 LAB
CREAT UR-MCNC: 39.6 MG/DL
PROT UR-MCNC: 5 MG/DL
PROT/CREAT UR: 126.3 MG/G CREA (ref 0–200)

## 2020-09-08 PROCEDURE — 84156 ASSAY OF PROTEIN URINE: CPT

## 2020-09-08 PROCEDURE — 82570 ASSAY OF URINE CREATININE: CPT

## 2020-09-08 PROCEDURE — 84166 PROTEIN E-PHORESIS/URINE/CSF: CPT

## 2020-09-09 ENCOUNTER — OFFICE VISIT (OUTPATIENT)
Dept: ENDOCRINOLOGY | Facility: CLINIC | Age: 75
End: 2020-09-09

## 2020-09-09 VITALS
OXYGEN SATURATION: 96 % | HEIGHT: 67 IN | SYSTOLIC BLOOD PRESSURE: 102 MMHG | BODY MASS INDEX: 23.97 KG/M2 | HEART RATE: 73 BPM | DIASTOLIC BLOOD PRESSURE: 58 MMHG | WEIGHT: 152.7 LBS

## 2020-09-09 DIAGNOSIS — E55.9 VITAMIN D DEFICIENCY: ICD-10-CM

## 2020-09-09 DIAGNOSIS — I10 ESSENTIAL HYPERTENSION: Primary | ICD-10-CM

## 2020-09-09 DIAGNOSIS — E78.49 OTHER HYPERLIPIDEMIA: ICD-10-CM

## 2020-09-09 DIAGNOSIS — E03.2 HYPOTHYROIDISM DUE TO MEDICATION: ICD-10-CM

## 2020-09-09 DIAGNOSIS — IMO0002 UNCONTROLLED TYPE 2 DIABETES MELLITUS WITH COMPLICATION, WITH LONG-TERM CURRENT USE OF INSULIN: ICD-10-CM

## 2020-09-09 PROCEDURE — 99214 OFFICE O/P EST MOD 30 MIN: CPT | Performed by: NURSE PRACTITIONER

## 2020-09-09 RX ORDER — LEVOTHYROXINE SODIUM 0.07 MG/1
75 TABLET ORAL DAILY
Qty: 30 TABLET | Refills: 11 | Status: SHIPPED | OUTPATIENT
Start: 2020-09-09 | End: 2021-03-25

## 2020-09-09 NOTE — PROGRESS NOTES
Subjective    Jamilah Vee is a 75 y.o. female. she is here today for follow-up.    History of Present Illness         IN OFFICE VISIT      Reason - diabetes mellitus      Duration/Timing:  Diabetes mellitus type 2, Age at onset of diabetes: 61 years, Onset of symptoms gradual  timing - constant     quality - not controlled     severity - high        Severity (Complications/Hospitalizations)  Secondary Macrovascular Complications:  CAD   2 stents in May 2014  ICD - defibrillator  Secondary Microvascular Complications:  No Diabetic Nephropathy, No proteinuria, No Diabetic Retinopathy, No Diabetic Neuropathy     Context  Diabetes Regimen:  Insulin, Oral Medications      Lab Results   Component Value Date    HGBA1C 9.50 (H) 09/03/2020                               Blood Glucose Readings           States 100 to 250      This am was 87         Diet        Eating sweets            Exercise:  Does not exercise     Associated Signs/Symptoms  Hyperglycemic Symptoms:  No polyuria, No polydipsia, No polyphagia  Hypoglycemic Episodes:  No documented hypoglycemia  Modifying Factors/Comorbidities:  Hypertension, Hyperlipidemia                        The following portions of the patient's history were reviewed and updated as appropriate:   Past Medical History:   Diagnosis Date   • Dyslipidemia    • Essential hypertension    • Hypercalcemia    • Osteopenia    • Type II diabetes mellitus, uncontrolled (CMS/[facility])    • Vitamin D deficiency      Past Surgical History:   Procedure Laterality Date   • PACEMAKER IMPLANTATION      Pacemaker AICD pocket (1)       Family History   Problem Relation Age of Onset   • Coronary artery disease Other    • Hypertension Other      OB History    None       Current Outpatient Medications   Medication Sig Dispense Refill   • amiodarone (PACERONE) 200 MG tablet Take 200 mg by mouth Daily.     • aspirin 81 MG tablet Take 81 mg by mouth daily.     • atorvastatin (LIPITOR) 40 MG tablet Take 40 mg by  mouth daily.     • bumetanide (BUMEX) 1 MG tablet Take 2 mg by mouth Daily.     • Calcium Carb-Cholecalciferol (Calcium 1000 + D) 1000-800 MG-UNIT tablet calcium   600 mg with vitamin d 500 unit daily     • insulin aspart (NovoLOG FlexPen) 100 UNIT/ML solution pen-injector sc pen Inject 15 Units under the skin into the appropriate area as directed 3 (Three) Times a Day With Meals. 5 pen 11   • Insulin Pen Needle (PEN NEEDLES) 31G X 8 MM misc 4 (four) times a day. Use as indicated 4 times daily.     • LANTUS SOLOSTAR 100 UNIT/ML injection pen INJECT 50 UNITS SUBCUTANEOUSLY EVERY NIGHT AT BEDTIME 5 pen 3   • levothyroxine (Synthroid) 50 MCG tablet Take 1 tablet by mouth Daily. 30 tablet 11   • levothyroxine (SYNTHROID, LEVOTHROID) 25 MCG tablet Take 1 tablet by mouth Daily. 30 tablet 11   • losartan (COZAAR) 25 MG tablet Take 25 mg by mouth As Needed (Takes when BP is over systolic is over 110.).     • metoprolol tartrate (LOPRESSOR) 50 MG tablet Take 25 mg by mouth Daily. One-half tablet daily     • multivitamin (THERAGRAN) tablet tablet Take  by mouth Daily.     • potassium chloride (KAYCIEL) 20 MEQ/15ML (10%) solution Take  by mouth Daily.     • spironolactone (ALDACTONE) 25 MG tablet Take 25 mg by mouth daily.     • levothyroxine (Synthroid) 75 MCG tablet Take 1 tablet by mouth Daily. 30 tablet 11   • mirtazapine (REMERON) 7.5 MG half tablet Take 15 mg by mouth Every Night.       No current facility-administered medications for this visit.      No Known Allergies  Social History     Socioeconomic History   • Marital status: Unknown     Spouse name: Not on file   • Number of children: Not on file   • Years of education: Not on file   • Highest education level: Not on file   Tobacco Use   • Smoking status: Never Smoker   • Smokeless tobacco: Never Used   Substance and Sexual Activity   • Alcohol use: No       Review of Systems  Review of Systems   Constitutional: Negative for activity change, appetite change,  "diaphoresis and fatigue.   HENT: Negative for facial swelling, sneezing, sore throat, tinnitus, trouble swallowing and voice change.    Eyes: Negative for photophobia, pain, discharge, redness, itching and visual disturbance.   Respiratory: Negative for apnea, cough, choking, chest tightness and shortness of breath.    Cardiovascular: Negative for chest pain, palpitations and leg swelling.   Gastrointestinal: Negative for abdominal distention, abdominal pain, constipation, diarrhea, nausea and vomiting.   Endocrine: Negative for cold intolerance, heat intolerance, polydipsia, polyphagia and polyuria.   Genitourinary: Negative for difficulty urinating, dysuria, frequency, hematuria and urgency.   Musculoskeletal: Negative for arthralgias, back pain, gait problem, joint swelling, myalgias, neck pain and neck stiffness.   Skin: Negative for color change, pallor, rash and wound.   Neurological: Negative for dizziness, tremors, weakness, light-headedness, numbness and headaches.   Hematological: Negative for adenopathy. Does not bruise/bleed easily.   Psychiatric/Behavioral: Negative for behavioral problems, confusion and sleep disturbance.        Objective    /58   Pulse 73   Ht 168.9 cm (66.5\")   Wt 69.3 kg (152 lb 11.2 oz)   LMP  (LMP Unknown)   SpO2 96%   BMI 24.28 kg/m²   Physical Exam   Constitutional: She is oriented to person, place, and time. She appears well-developed and well-nourished. No distress.   HENT:   Head: Normocephalic and atraumatic.   Right Ear: External ear normal.   Left Ear: External ear normal.   Nose: Nose normal.   Eyes: Pupils are equal, round, and reactive to light. Conjunctivae and EOM are normal.   Neck: Normal range of motion. Neck supple. No tracheal deviation present. No thyromegaly present.   Cardiovascular: Normal rate, regular rhythm and normal heart sounds.   No murmur heard.  Pulmonary/Chest: Effort normal and breath sounds normal. No respiratory distress. She has no " wheezes.   Abdominal: Soft. Bowel sounds are normal. There is no tenderness. There is no rebound and no guarding.   Musculoskeletal: Normal range of motion. She exhibits no edema, tenderness or deformity.   Neurological: She is alert and oriented to person, place, and time. No cranial nerve deficit.   Skin: Skin is warm and dry. No rash noted.   Psychiatric: She has a normal mood and affect. Her behavior is normal. Judgment and thought content normal.       Lab Review  Glucose (mg/dL)   Date Value   09/03/2020 109 (H)   06/01/2020 84   10/11/2019 71   08/01/2019 237 (A)   11/12/2018 173 (A)     Sodium (mmol/L)   Date Value   09/03/2020 136   06/01/2020 132 (L)   10/11/2019 139   08/01/2019 136   11/12/2018 136     Potassium (mmol/L)   Date Value   09/03/2020 4.7   06/01/2020 4.0   10/11/2019 4.1   08/01/2019 4.9   02/28/2019 4.1   11/12/2018 4.4     Chloride (mmol/L)   Date Value   09/03/2020 99   06/01/2020 89 (L)   10/11/2019 95 (L)   08/01/2019 101   11/12/2018 99     CO2 (mmol/L)   Date Value   09/03/2020 24.3   06/01/2020 31.7 (H)   10/11/2019 30.4 (H)     Total CO2 (mmol/L)   Date Value   08/01/2019 30   11/12/2018 30     BUN (mg/dL)   Date Value   09/03/2020 26 (H)   06/01/2020 37 (H)   10/11/2019 22   08/01/2019 24 (A)   11/12/2018 15     Creatinine (mg/dL)   Date Value   09/03/2020 1.52 (H)   06/01/2020 1.94 (H)   10/11/2019 1.66 (H)   08/01/2019 1.47 (A)   11/12/2018 1.64 (A)     Hemoglobin A1C (%)   Date Value   09/03/2020 9.50 (H)   06/16/2020 10.5   06/01/2020 9.93 (H)   01/17/2020 8.1   11/21/2019 9.5     Triglycerides (mg/dL)   Date Value   09/03/2020 128   06/01/2020 97   01/17/2020 116   06/14/2019 119     LDL Cholesterol  (mg/dL)   Date Value   09/03/2020 53   06/01/2020 46   01/17/2020 29   06/14/2019 64       Assessment/Plan      1. Essential hypertension    2. Other hyperlipidemia    3. Vitamin D deficiency    4. Uncontrolled type 2 diabetes mellitus with complication, with long-term current use  of insulin (CMS/[facility])    5. Hypothyroidism due to medication    .    Medications prescribed:  Outpatient Encounter Medications as of 9/9/2020   Medication Sig Dispense Refill   • amiodarone (PACERONE) 200 MG tablet Take 200 mg by mouth Daily.     • aspirin 81 MG tablet Take 81 mg by mouth daily.     • atorvastatin (LIPITOR) 40 MG tablet Take 40 mg by mouth daily.     • bumetanide (BUMEX) 1 MG tablet Take 2 mg by mouth Daily.     • Calcium Carb-Cholecalciferol (Calcium 1000 + D) 1000-800 MG-UNIT tablet calcium   600 mg with vitamin d 500 unit daily     • insulin aspart (NovoLOG FlexPen) 100 UNIT/ML solution pen-injector sc pen Inject 15 Units under the skin into the appropriate area as directed 3 (Three) Times a Day With Meals. 5 pen 11   • Insulin Pen Needle (PEN NEEDLES) 31G X 8 MM misc 4 (four) times a day. Use as indicated 4 times daily.     • LANTUS SOLOSTAR 100 UNIT/ML injection pen INJECT 50 UNITS SUBCUTANEOUSLY EVERY NIGHT AT BEDTIME 5 pen 3   • levothyroxine (Synthroid) 50 MCG tablet Take 1 tablet by mouth Daily. 30 tablet 11   • levothyroxine (SYNTHROID, LEVOTHROID) 25 MCG tablet Take 1 tablet by mouth Daily. 30 tablet 11   • losartan (COZAAR) 25 MG tablet Take 25 mg by mouth As Needed (Takes when BP is over systolic is over 110.).     • metoprolol tartrate (LOPRESSOR) 50 MG tablet Take 25 mg by mouth Daily. One-half tablet daily     • multivitamin (THERAGRAN) tablet tablet Take  by mouth Daily.     • potassium chloride (KAYCIEL) 20 MEQ/15ML (10%) solution Take  by mouth Daily.     • spironolactone (ALDACTONE) 25 MG tablet Take 25 mg by mouth daily.     • levothyroxine (Synthroid) 75 MCG tablet Take 1 tablet by mouth Daily. 30 tablet 11   • mirtazapine (REMERON) 7.5 MG half tablet Take 15 mg by mouth Every Night.     • [DISCONTINUED] albuterol sulfate  (90 Base) MCG/ACT inhaler 2 puffs morning, noon,4 o'clock and at bedtime for one week,then 4 times daily as needed. 18 g 3   • [DISCONTINUED]  azithromycin (ZITHROMAX) 250 MG tablet Take 2 tablets the first day, then 1 tablet daily for 4 days. 6 tablet 0   • [DISCONTINUED] calcium carbonate (OS-CARLOS) 600 MG tablet Take 600 mg by mouth Daily.     • [DISCONTINUED] clopidogrel (PLAVIX) 75 MG tablet Take 75 mg by mouth daily.     • [DISCONTINUED] hydrALAZINE (APRESOLINE) 50 MG tablet Take 50 mg by mouth 3 (Three) Times a Day.     • [DISCONTINUED] isosorbide dinitrate (ISORDIL) 30 MG tablet Take 30 mg by mouth 3 (Three) Times a Day.       No facility-administered encounter medications on file as of 9/9/2020.        Orders placed during this encounter include:  Orders Placed This Encounter   Procedures   • Comprehensive Metabolic Panel     Standing Status:   Future     Standing Expiration Date:   9/9/2021   • Hemoglobin A1c     Standing Status:   Future     Standing Expiration Date:   9/9/2021   • Lipid Panel     Standing Status:   Future     Standing Expiration Date:   9/9/2021   • Vitamin D 25 Hydroxy     Standing Status:   Future     Standing Expiration Date:   9/9/2021   • TSH     Standing Status:   Future     Standing Expiration Date:   9/9/2021   • Vitamin B12     Standing Status:   Future     Standing Expiration Date:   9/9/2021   • Protein / Creatinine Ratio, Urine - Urine, Clean Catch     Standing Status:   Future     Standing Expiration Date:   9/9/2021   • Microalbumin / Creatinine Urine Ratio - Urine, Clean Catch     Standing Status:   Future     Standing Expiration Date:   9/9/2021   • CBC & Differential     Standing Status:   Future     Standing Expiration Date:   9/9/2021     Order Specific Question:   Manual Differential     Answer:   No     Glycemic Management       Lab Results   Component Value Date    HGBA1C 9.50 (H) 09/03/2020                       does not always giving insulin         Lantus taking  36 units              if you wake up with a sugar in the morning  less than 100 -- decrease by 5 units and don't raise it again      --------      Janumet 100/1000 mg with supper----stopped due to expensive      metformin 1000mg  stop due to GFR         ----------     stop glimepiride since not enough        -----------     Novolog      Given 6 units --increase to 8 units      151-200 : 2 units   201-250 : 4units  251-300 : 6units  above 300 : 7 units     +              Lipid Management           Component      Latest Ref Rng & Units 9/3/2020   Total Cholesterol      0 - 200 mg/dL 120   Triglycerides      0 - 150 mg/dL 128   HDL Cholesterol      40 - 60 mg/dL 41   LDL Cholesterol       0 - 100 mg/dL 53   VLDL Cholesterol      5 - 40 mg/dL 25.6   LDL/HDL Ratio       1.30                 lipitor 40 mg one night      Blood Pressure Management           on metoprolol 501/2 tablet po BID  aldactone 25 mg qd - 1/2 tablet daily   losartan 25 mg qd  furosemide 40 mg qd--- stopped  Bumex 1 mg  one daily         amiodarone 200 mg daily               Microvascular Complication Monitoring     eye exam -- August 2019 , no DR         Neuropathy -- none            Seeing             Component      Latest Ref Rng & Units 9/3/2020 9/3/2020           9:25 AM  9:25 AM   Protein/Creatinine Ratio      0.0 - 200.0 mg/G Crea 259.2 (H)    Creatinine, Urine      mg/dL 231.5 231.5   Total Protein, Urine      mg/dL 60.0    Microalbumin/Creatinine Ratio      mg/g  9.1   Microalbumin, Urine      mg/dL  2.1              Bone Health           Component      Latest Ref Rng & Units 9/3/2020   25 Hydroxy, Vitamin D      30.0 - 100.0 ng/ml 31.8                  taking otc daily            Other Diabetes Related Aspects             Hypothyroidism due to medication          Component      Latest Ref Rng & Units 11/16/2018   TSH Baseline      0.460 - 4.680 mIU/mL 7.000 (H)   Free T4      0.78 - 2.19 ng/dL 1.88   T3, Total      97.0 - 169.0 ng/dl 117.0   Thyrotropin Receptor Antibody      0.00 - 1.75 IU/L <0.50   Thyroid Stimulating Immunoglobulin      0.00 - 0.55 IU/L <0.10   Thyroid  Peroxidase Antibody      0 - 34 IU/mL 12      Appears to be amiodarone induced                 Medication must me taken on an empty stomach at least one hour before food, drink and other medications. Only take with water. If taking vitamins or antiacids needs to be 4 hours before or after.          INCREASE TO LEVOTHYROXINE 50 MCG DAILY       Lab Results   Component Value Date    TSH 12.400 (H) 09/03/2020        increase to 75 mcg daily         4. Follow-up: No follow-ups on file.

## 2020-09-10 LAB
ALBUMIN 24H MFR UR ELPH: 34.7 %
ALPHA1 GLOB 24H MFR UR ELPH: 7.3 %
ALPHA2 GLOB 24H MFR UR ELPH: 15.1 %
B-GLOBULIN MFR UR ELPH: 22.5 %
GAMMA GLOB 24H MFR UR ELPH: 20.4 %
Lab: ABNORMAL
M PROTEIN MFR UR ELPH: ABNORMAL %
PROT 24H UR-MRATE: 235 MG/24 HR (ref 30–150)
PROT UR-MCNC: 19.6 MG/DL

## 2020-12-28 ENCOUNTER — LAB (OUTPATIENT)
Dept: LAB | Facility: HOSPITAL | Age: 75
End: 2020-12-28

## 2020-12-28 DIAGNOSIS — IMO0002 UNCONTROLLED TYPE 2 DIABETES MELLITUS WITH COMPLICATION, WITH LONG-TERM CURRENT USE OF INSULIN: ICD-10-CM

## 2020-12-28 DIAGNOSIS — I10 ESSENTIAL HYPERTENSION: ICD-10-CM

## 2020-12-28 DIAGNOSIS — E55.9 VITAMIN D DEFICIENCY: ICD-10-CM

## 2020-12-28 DIAGNOSIS — E03.2 HYPOTHYROIDISM DUE TO MEDICATION: ICD-10-CM

## 2020-12-28 DIAGNOSIS — E78.49 OTHER HYPERLIPIDEMIA: ICD-10-CM

## 2020-12-28 LAB
25(OH)D3 SERPL-MCNC: 43.7 NG/ML (ref 30–100)
ALBUMIN SERPL-MCNC: 3.9 G/DL (ref 3.5–5.2)
ALBUMIN UR-MCNC: 1.7 MG/DL
ALBUMIN/GLOB SERPL: 1.1 G/DL
ALP SERPL-CCNC: 73 U/L (ref 39–117)
ALT SERPL W P-5'-P-CCNC: 21 U/L (ref 1–33)
ANION GAP SERPL CALCULATED.3IONS-SCNC: 10.7 MMOL/L (ref 5–15)
AST SERPL-CCNC: 24 U/L (ref 1–32)
BASOPHILS # BLD AUTO: 0.05 10*3/MM3 (ref 0–0.2)
BASOPHILS NFR BLD AUTO: 0.8 % (ref 0–1.5)
BILIRUB SERPL-MCNC: 0.6 MG/DL (ref 0–1.2)
BUN SERPL-MCNC: 61 MG/DL (ref 8–23)
BUN/CREAT SERPL: 22.7 (ref 7–25)
CALCIUM SPEC-SCNC: 9.9 MG/DL (ref 8.6–10.5)
CHLORIDE SERPL-SCNC: 97 MMOL/L (ref 98–107)
CHOLEST SERPL-MCNC: 122 MG/DL (ref 0–200)
CO2 SERPL-SCNC: 29.3 MMOL/L (ref 22–29)
CREAT SERPL-MCNC: 2.69 MG/DL (ref 0.57–1)
CREAT UR-MCNC: 139.9 MG/DL
CREAT UR-MCNC: 140.5 MG/DL
CREAT UR-MCNC: 140.5 MG/DL
DEPRECATED RDW RBC AUTO: 44.5 FL (ref 37–54)
EOSINOPHIL # BLD AUTO: 0.13 10*3/MM3 (ref 0–0.4)
EOSINOPHIL NFR BLD AUTO: 2.1 % (ref 0.3–6.2)
ERYTHROCYTE [DISTWIDTH] IN BLOOD BY AUTOMATED COUNT: 13.9 % (ref 12.3–15.4)
GFR SERPL CREATININE-BSD FRML MDRD: 17 ML/MIN/1.73
GLOBULIN UR ELPH-MCNC: 3.7 GM/DL
GLUCOSE SERPL-MCNC: 137 MG/DL (ref 65–99)
HBA1C MFR BLD: 8.1 % (ref 4.8–5.6)
HCT VFR BLD AUTO: 38.6 % (ref 34–46.6)
HDLC SERPL-MCNC: 38 MG/DL (ref 40–60)
HGB BLD-MCNC: 13.2 G/DL (ref 12–15.9)
IMM GRANULOCYTES # BLD AUTO: 0.01 10*3/MM3 (ref 0–0.05)
IMM GRANULOCYTES NFR BLD AUTO: 0.2 % (ref 0–0.5)
LDLC SERPL CALC-MCNC: 54 MG/DL (ref 0–100)
LDLC/HDLC SERPL: 1.26 {RATIO}
LYMPHOCYTES # BLD AUTO: 1.17 10*3/MM3 (ref 0.7–3.1)
LYMPHOCYTES NFR BLD AUTO: 19.2 % (ref 19.6–45.3)
MCH RBC QN AUTO: 29.9 PG (ref 26.6–33)
MCHC RBC AUTO-ENTMCNC: 34.2 G/DL (ref 31.5–35.7)
MCV RBC AUTO: 87.3 FL (ref 79–97)
MICROALBUMIN/CREAT UR: 12.1 MG/G
MONOCYTES # BLD AUTO: 0.52 10*3/MM3 (ref 0.1–0.9)
MONOCYTES NFR BLD AUTO: 8.5 % (ref 5–12)
NEUTROPHILS NFR BLD AUTO: 4.22 10*3/MM3 (ref 1.7–7)
NEUTROPHILS NFR BLD AUTO: 69.2 % (ref 42.7–76)
NRBC BLD AUTO-RTO: 0 /100 WBC (ref 0–0.2)
PLATELET # BLD AUTO: 174 10*3/MM3 (ref 140–450)
PMV BLD AUTO: 12.6 FL (ref 6–12)
POTASSIUM SERPL-SCNC: 4.9 MMOL/L (ref 3.5–5.2)
PROT SERPL-MCNC: 7.6 G/DL (ref 6–8.5)
PROT UR-MCNC: 15 MG/DL
PROT UR-MCNC: 15 MG/DL
PROT/CREAT UR: 106.8 MG/G CREA (ref 0–200)
PROT/CREAT UR: 107.2 MG/G CREA (ref 0–200)
RBC # BLD AUTO: 4.42 10*6/MM3 (ref 3.77–5.28)
SODIUM SERPL-SCNC: 137 MMOL/L (ref 136–145)
TRIGL SERPL-MCNC: 181 MG/DL (ref 0–150)
TSH SERPL DL<=0.05 MIU/L-ACNC: 5.73 UIU/ML (ref 0.27–4.2)
VIT B12 BLD-MCNC: 566 PG/ML (ref 211–946)
VLDLC SERPL-MCNC: 30 MG/DL (ref 5–40)
WBC # BLD AUTO: 6.1 10*3/MM3 (ref 3.4–10.8)

## 2020-12-28 PROCEDURE — 85025 COMPLETE CBC W/AUTO DIFF WBC: CPT

## 2020-12-28 PROCEDURE — 83036 HEMOGLOBIN GLYCOSYLATED A1C: CPT

## 2020-12-28 PROCEDURE — 82306 VITAMIN D 25 HYDROXY: CPT

## 2020-12-28 PROCEDURE — 82043 UR ALBUMIN QUANTITATIVE: CPT

## 2020-12-28 PROCEDURE — 82607 VITAMIN B-12: CPT

## 2020-12-28 PROCEDURE — 80061 LIPID PANEL: CPT

## 2020-12-28 PROCEDURE — 84156 ASSAY OF PROTEIN URINE: CPT | Performed by: NURSE PRACTITIONER

## 2020-12-28 PROCEDURE — 82570 ASSAY OF URINE CREATININE: CPT | Performed by: NURSE PRACTITIONER

## 2020-12-28 PROCEDURE — 84156 ASSAY OF PROTEIN URINE: CPT

## 2020-12-28 PROCEDURE — 82570 ASSAY OF URINE CREATININE: CPT

## 2020-12-28 PROCEDURE — 80053 COMPREHEN METABOLIC PANEL: CPT

## 2020-12-28 PROCEDURE — 84443 ASSAY THYROID STIM HORMONE: CPT

## 2020-12-31 ENCOUNTER — OFFICE VISIT (OUTPATIENT)
Dept: ENDOCRINOLOGY | Facility: CLINIC | Age: 75
End: 2020-12-31

## 2020-12-31 VITALS
HEIGHT: 67 IN | SYSTOLIC BLOOD PRESSURE: 104 MMHG | OXYGEN SATURATION: 97 % | WEIGHT: 149.5 LBS | DIASTOLIC BLOOD PRESSURE: 60 MMHG | HEART RATE: 70 BPM | BODY MASS INDEX: 23.46 KG/M2

## 2020-12-31 DIAGNOSIS — I10 ESSENTIAL HYPERTENSION: ICD-10-CM

## 2020-12-31 DIAGNOSIS — IMO0002 UNCONTROLLED TYPE 2 DIABETES MELLITUS WITH COMPLICATION, WITH LONG-TERM CURRENT USE OF INSULIN: Primary | ICD-10-CM

## 2020-12-31 DIAGNOSIS — E78.49 OTHER HYPERLIPIDEMIA: ICD-10-CM

## 2020-12-31 DIAGNOSIS — E03.2 HYPOTHYROIDISM DUE TO MEDICATION: ICD-10-CM

## 2020-12-31 DIAGNOSIS — E55.9 VITAMIN D DEFICIENCY: ICD-10-CM

## 2020-12-31 PROCEDURE — 99214 OFFICE O/P EST MOD 30 MIN: CPT | Performed by: NURSE PRACTITIONER

## 2020-12-31 RX ORDER — PEN NEEDLE, DIABETIC 30 GX3/16"
NEEDLE, DISPOSABLE MISCELLANEOUS
Qty: 120 EACH | Refills: 11 | Status: SHIPPED | OUTPATIENT
Start: 2020-12-31

## 2020-12-31 RX ORDER — LEVOTHYROXINE SODIUM 88 UG/1
88 TABLET ORAL DAILY
Qty: 30 TABLET | Refills: 11 | Status: SHIPPED | OUTPATIENT
Start: 2020-12-31 | End: 2021-06-25

## 2020-12-31 NOTE — PROGRESS NOTES
Subjective    Jamilah Vee is a 75 y.o. female. she is here today for follow-up.    History of Present Illness     IN OFFICE VISIT     75-year-old female presents for follow-up    Reason diabetes mellitus type 2    Duration diagnosed at the age of 61    Onset of symptoms were gradual    Timing constant    Quality not controlled but improved    Severity is high      She did fall in November and fractured her arm     Severity (Complications/Hospitalizations)  Secondary Macrovascular Complications:  CAD   2 stents in May 2014  ICD - defibrillator  Secondary Microvascular Complications: +  Diabetic Nephropathy, No proteinuria, No Diabetic Retinopathy, No Diabetic Neuropathy    CKD stage III     Context  Diabetes Regimen:  Insulin,        Lab Results   Component Value Date    HGBA1C 8.10 (H) 12/28/2020                         Blood Glucose Readings           This am was 121    States running from 89  Up to 189     No lows      Diet        Low carb    Has cut  Out sweets            Exercise: none     Associated Signs/Symptoms  Hyperglycemic Symptoms:  None  Hypoglycemic Episodes:   documented hypoglycemia none         Hypothyroidism due to amiodarone     Duration -- 2019    timing constant    quality not controlled     Severity mild    Alleviating factors compliance with medication     Aggravating factors none    Lab Results   Component Value Date    TSH 5.730 (H) 12/28/2020             Modifying Factors/Comorbidities:  Hypertension, Hyperlipidemia                  The following portions of the patient's history were reviewed and updated as appropriate:   Past Medical History:   Diagnosis Date   • Dyslipidemia    • Essential hypertension    • Hypercalcemia    • Osteopenia    • Type II diabetes mellitus, uncontrolled (CMS/HCC)    • Vitamin D deficiency      Past Surgical History:   Procedure Laterality Date   • PACEMAKER IMPLANTATION      Pacemaker AICD pocket (1)       Family History   Problem Relation Age of Onset    • Coronary artery disease Other    • Hypertension Other      OB History    No obstetric history on file.       Current Outpatient Medications   Medication Sig Dispense Refill   • amiodarone (PACERONE) 200 MG tablet Take 200 mg by mouth Daily.     • aspirin 81 MG tablet Take 81 mg by mouth daily.     • atorvastatin (LIPITOR) 40 MG tablet Take 40 mg by mouth daily.     • bumetanide (BUMEX) 1 MG tablet Take 2 mg by mouth Daily.     • Calcium Carb-Cholecalciferol (Calcium 1000 + D) 1000-800 MG-UNIT tablet calcium   600 mg with vitamin d 500 unit daily     • insulin aspart (NovoLOG FlexPen) 100 UNIT/ML solution pen-injector sc pen Inject 15 Units under the skin into the appropriate area as directed 3 (Three) Times a Day With Meals. 5 pen 11   • Insulin Pen Needle (Pen Needles) 31G X 8 MM misc Use as indicated 4 times daily. , E11.9 120 each 11   • LANTUS SOLOSTAR 100 UNIT/ML injection pen INJECT 50 UNITS SUBCUTANEOUSLY EVERY NIGHT AT BEDTIME 5 pen 3   • levothyroxine (Synthroid) 75 MCG tablet Take 1 tablet by mouth Daily. 30 tablet 11   • losartan (COZAAR) 25 MG tablet Take 25 mg by mouth As Needed (Takes when BP is over systolic is over 110.).     • metoprolol tartrate (LOPRESSOR) 50 MG tablet Take 25 mg by mouth Daily. One-half tablet daily     • mirtazapine (REMERON) 7.5 MG half tablet Take 15 mg by mouth Every Night.     • multivitamin (THERAGRAN) tablet tablet Take  by mouth Daily.     • potassium chloride (KAYCIEL) 20 MEQ/15ML (10%) solution Take  by mouth Daily.     • spironolactone (ALDACTONE) 25 MG tablet Take 25 mg by mouth daily.     • levothyroxine (Synthroid) 88 MCG tablet Take 1 tablet by mouth Daily. 30 tablet 11   • levothyroxine (SYNTHROID, LEVOTHROID) 25 MCG tablet Take 1 tablet by mouth Daily. 30 tablet 11     No current facility-administered medications for this visit.      No Known Allergies  Social History     Socioeconomic History   • Marital status: Unknown     Spouse name: Not on file   •  "Number of children: Not on file   • Years of education: Not on file   • Highest education level: Not on file   Tobacco Use   • Smoking status: Never Smoker   • Smokeless tobacco: Never Used   Substance and Sexual Activity   • Alcohol use: No       Review of Systems  Review of Systems   Constitutional: Negative for activity change, appetite change, diaphoresis and fatigue.   HENT: Negative for facial swelling, sneezing, sore throat, tinnitus, trouble swallowing and voice change.    Eyes: Negative for photophobia, pain, discharge, redness, itching and visual disturbance.   Respiratory: Negative for apnea, cough, choking, chest tightness and shortness of breath.    Cardiovascular: Negative for chest pain, palpitations and leg swelling.   Gastrointestinal: Negative for abdominal distention, abdominal pain, constipation, diarrhea, nausea and vomiting.   Endocrine: Negative for cold intolerance, heat intolerance, polydipsia, polyphagia and polyuria.   Genitourinary: Negative for difficulty urinating, dysuria, frequency, hematuria and urgency.   Musculoskeletal: Positive for arthralgias and myalgias. Negative for back pain, gait problem, joint swelling, neck pain and neck stiffness.   Skin: Negative for color change, pallor, rash and wound.   Neurological: Negative for dizziness, tremors, weakness, light-headedness, numbness and headaches.   Hematological: Negative for adenopathy. Does not bruise/bleed easily.   Psychiatric/Behavioral: Negative for behavioral problems, confusion and sleep disturbance.        Objective    /60   Pulse 70   Ht 168.9 cm (66.5\")   Wt 67.8 kg (149 lb 8 oz)   LMP  (LMP Unknown)   SpO2 97%   BMI 23.77 kg/m²   Physical Exam  Constitutional:       General: She is not in acute distress.     Appearance: She is well-developed.   HENT:      Head: Normocephalic and atraumatic.      Right Ear: External ear normal.      Left Ear: External ear normal.      Nose: Nose normal.   Eyes:      " Conjunctiva/sclera: Conjunctivae normal.      Pupils: Pupils are equal, round, and reactive to light.   Neck:      Musculoskeletal: Normal range of motion and neck supple.      Thyroid: No thyromegaly.      Trachea: No tracheal deviation.   Cardiovascular:      Rate and Rhythm: Normal rate and regular rhythm.      Heart sounds: Normal heart sounds. No murmur.   Pulmonary:      Effort: Pulmonary effort is normal. No respiratory distress.      Breath sounds: Normal breath sounds. No wheezing.   Abdominal:      General: Bowel sounds are normal.      Palpations: Abdomen is soft.      Tenderness: There is no abdominal tenderness. There is no guarding or rebound.   Musculoskeletal: Normal range of motion.         General: No tenderness or deformity.      Comments: In wheelchair   Skin:     General: Skin is warm and dry.      Findings: No rash.   Neurological:      Mental Status: She is alert and oriented to person, place, and time.      Cranial Nerves: No cranial nerve deficit.   Psychiatric:         Behavior: Behavior normal.         Thought Content: Thought content normal.         Judgment: Judgment normal.         Lab Review  Glucose (mg/dL)   Date Value   12/28/2020 137 (H)   09/03/2020 109 (H)   06/01/2020 84   08/01/2019 237 (A)   11/12/2018 173 (A)     Sodium (mmol/L)   Date Value   12/28/2020 137   09/03/2020 136   06/01/2020 132 (L)   08/01/2019 136   11/12/2018 136     Potassium (mmol/L)   Date Value   12/28/2020 4.9   09/03/2020 4.7   06/01/2020 4.0   08/01/2019 4.9   02/28/2019 4.1   11/12/2018 4.4     Chloride (mmol/L)   Date Value   12/28/2020 97 (L)   09/03/2020 99   06/01/2020 89 (L)   08/01/2019 101   11/12/2018 99     CO2 (mmol/L)   Date Value   12/28/2020 29.3 (H)   09/03/2020 24.3   06/01/2020 31.7 (H)     Total CO2 (mmol/L)   Date Value   08/01/2019 30   11/12/2018 30     BUN (mg/dL)   Date Value   12/28/2020 61 (H)   09/03/2020 26 (H)   06/01/2020 37 (H)   08/01/2019 24 (A)   11/12/2018 15      Creatinine (mg/dL)   Date Value   12/28/2020 2.69 (H)   09/03/2020 1.52 (H)   06/01/2020 1.94 (H)   08/01/2019 1.47 (A)   11/12/2018 1.64 (A)     Hemoglobin A1C (%)   Date Value   12/28/2020 8.10 (H)   09/03/2020 9.50 (H)   06/16/2020 10.5   06/01/2020 9.93 (H)   01/17/2020 8.1   11/21/2019 9.5     Triglycerides (mg/dL)   Date Value   12/28/2020 181 (H)   09/03/2020 128   06/01/2020 97   01/17/2020 116     LDL Cholesterol  (mg/dL)   Date Value   12/28/2020 54   09/03/2020 53   06/01/2020 46   01/17/2020 29       Assessment/Plan      1. Uncontrolled type 2 diabetes mellitus with complication, with long-term current use of insulin (CMS/[facility])    2. Essential hypertension    3. Other hyperlipidemia    4. Vitamin D deficiency    5. Hypothyroidism due to medication    .    Medications prescribed:  Outpatient Encounter Medications as of 12/31/2020   Medication Sig Dispense Refill   • amiodarone (PACERONE) 200 MG tablet Take 200 mg by mouth Daily.     • aspirin 81 MG tablet Take 81 mg by mouth daily.     • atorvastatin (LIPITOR) 40 MG tablet Take 40 mg by mouth daily.     • bumetanide (BUMEX) 1 MG tablet Take 2 mg by mouth Daily.     • Calcium Carb-Cholecalciferol (Calcium 1000 + D) 1000-800 MG-UNIT tablet calcium   600 mg with vitamin d 500 unit daily     • insulin aspart (NovoLOG FlexPen) 100 UNIT/ML solution pen-injector sc pen Inject 15 Units under the skin into the appropriate area as directed 3 (Three) Times a Day With Meals. 5 pen 11   • Insulin Pen Needle (Pen Needles) 31G X 8 MM misc Use as indicated 4 times daily. , E11.9 120 each 11   • LANTUS SOLOSTAR 100 UNIT/ML injection pen INJECT 50 UNITS SUBCUTANEOUSLY EVERY NIGHT AT BEDTIME 5 pen 3   • levothyroxine (Synthroid) 75 MCG tablet Take 1 tablet by mouth Daily. 30 tablet 11   • losartan (COZAAR) 25 MG tablet Take 25 mg by mouth As Needed (Takes when BP is over systolic is over 110.).     • metoprolol tartrate (LOPRESSOR) 50 MG tablet Take 25 mg by mouth Daily.  One-half tablet daily     • mirtazapine (REMERON) 7.5 MG half tablet Take 15 mg by mouth Every Night.     • multivitamin (THERAGRAN) tablet tablet Take  by mouth Daily.     • potassium chloride (KAYCIEL) 20 MEQ/15ML (10%) solution Take  by mouth Daily.     • spironolactone (ALDACTONE) 25 MG tablet Take 25 mg by mouth daily.     • [DISCONTINUED] Insulin Pen Needle (PEN NEEDLES) 31G X 8 MM misc 4 (four) times a day. Use as indicated 4 times daily.     • levothyroxine (Synthroid) 88 MCG tablet Take 1 tablet by mouth Daily. 30 tablet 11   • levothyroxine (SYNTHROID, LEVOTHROID) 25 MCG tablet Take 1 tablet by mouth Daily. 30 tablet 11   • [DISCONTINUED] levothyroxine (Synthroid) 50 MCG tablet Take 1 tablet by mouth Daily. 30 tablet 11     No facility-administered encounter medications on file as of 12/31/2020.        Orders placed during this encounter include:  Orders Placed This Encounter   Procedures   • Comprehensive Metabolic Panel     Standing Status:   Future     Standing Expiration Date:   12/31/2021   • Hemoglobin A1c     Standing Status:   Future     Standing Expiration Date:   12/31/2021   • Lipid Panel     Standing Status:   Future     Standing Expiration Date:   12/31/2021   • Microalbumin / Creatinine Urine Ratio - Urine, Clean Catch     Standing Status:   Future     Standing Expiration Date:   12/31/2021   • Protein / Creatinine Ratio, Urine - Urine, Clean Catch     Standing Status:   Future     Standing Expiration Date:   12/31/2021   • TSH     Standing Status:   Future     Standing Expiration Date:   12/31/2021   • Vitamin B12     Standing Status:   Future     Standing Expiration Date:   12/31/2021   • Vitamin D 25 Hydroxy     Standing Status:   Future     Standing Expiration Date:   12/31/2021   • CBC & Differential     Standing Status:   Future     Standing Expiration Date:   12/31/2021     Order Specific Question:   Manual Differential     Answer:   No     Glycemic Management           diabetes  mellitus type 2      Lab Results   Component Value Date    HGBA1C 8.10 (H) 12/28/2020               Lantus taking  36 units --keep this dosage               if you wake up with a sugar in the morning  less than 100 -- decrease by 5 units and don't raise it again        Novolog      Giving 6 up to 10 units based on meals -    Not covering all meals    Please cover all meals with mealtime insulin      151-200 : 2 units   201-250 : 4units  251-300 : 6units  above 300 : 7 units     +         --------  Previous regimen      Janumet 100/1000 mg with supper----stopped due to expensive      metformin 1000mg  stop due to GFR         ----------     stop glimepiride since not enough             Lipid Management           Total Cholesterol   Date Value Ref Range Status   12/28/2020 122 0 - 200 mg/dL Final     Triglycerides   Date Value Ref Range Status   12/28/2020 181 (H) 0 - 150 mg/dL Final   01/17/2020 116 <=149 mg/dL Final     HDL Cholesterol   Date Value Ref Range Status   12/28/2020 38 (L) 40 - 60 mg/dL Final   01/17/2020 23 (L) >=50 mg/dL Final     Comment:     Levels of HDL cholesterol above 60 mg/dL are considered a negative risk factor for coronary heart disease.   Source: NCEP ATP III Expert report, KRISTINA 2001; 285(19):2486-97; Circulation, 2002;106:3143.     LDL Cholesterol    Date Value Ref Range Status   12/28/2020 54 0 - 100 mg/dL Final   01/17/2020 29 1 - 129 mg/dL Final     Comment:     ATP III Classification of LDL Cholesterol          Optimal        (<100)      mg/dL          Near Optimal   (100-129)   mg/dL          Borderline High(130-159)   mg/dL          High           (160-189)   mg/dL          Very High      (>189)      mg/dL           Taking  lipitor 40 mg one night      Blood Pressure Management        Taking amiodarone 200 mg     Taking bumex 1 mg daily     Taking metoprolol 50 mg daily     Taking aldactone 25 mg daily                Microvascular Complication Monitoring     eye exam -- August 2020 ,  no DR         Neuropathy -- none          CKD stage III      Seeing               Component      Latest Ref Rng & Units 12/28/2020 12/28/2020          10:46 AM 10:46 AM   Protein/Creatinine Ratio      0.0 - 200.0 mg/G Crea  107.2   Creatinine, Urine      mg/dL 140.5 139.9   Total Protein, Urine      mg/dL  15.0   Microalbumin/Creatinine Ratio      mg/g 12.1    Microalbumin, Urine      mg/dL 1.7               Bone Health     Vitamin d def.    Taking vitamin d otc daily         Component      Latest Ref Rng & Units 12/28/2020   25 Hydroxy, Vitamin D      30.0 - 100.0 ng/ml 43.7      taking otc daily           Other Diabetes Related Aspects              Hypothyroidism due to medication          Component      Latest Ref Rng & Units 11/16/2018   TSH Baseline      0.460 - 4.680 mIU/mL 7.000 (H)   Free T4      0.78 - 2.19 ng/dL 1.88   T3, Total      97.0 - 169.0 ng/dl 117.0   Thyrotropin Receptor Antibody      0.00 - 1.75 IU/L <0.50   Thyroid Stimulating Immunoglobulin      0.00 - 0.55 IU/L <0.10   Thyroid Peroxidase Antibody      0 - 34 IU/mL 12      Appears to be amiodarone induced                  Medication must me taken on an empty stomach at least one hour before food, drink and other medications. Only take with water. If taking vitamins or antiacids needs to be 4 hours before or after.        Lab Results   Component Value Date    TSH 5.730 (H) 12/28/2020        taking levothyroxine 75 mcg daily --- increase to 88 mcg daily             4. Follow-up: Return in about 3 months (around 3/31/2021) for Recheck.

## 2021-02-03 ENCOUNTER — TRANSCRIBE ORDERS (OUTPATIENT)
Dept: LAB | Facility: HOSPITAL | Age: 76
End: 2021-02-03

## 2021-02-03 ENCOUNTER — LAB (OUTPATIENT)
Dept: LAB | Facility: HOSPITAL | Age: 76
End: 2021-02-03

## 2021-02-03 DIAGNOSIS — IMO0002 UNCONTROLLED TYPE 2 DIABETES MELLITUS WITH COMPLICATION, WITH LONG-TERM CURRENT USE OF INSULIN: ICD-10-CM

## 2021-02-03 DIAGNOSIS — E78.49 OTHER HYPERLIPIDEMIA: ICD-10-CM

## 2021-02-03 DIAGNOSIS — N18.4 CHRONIC KIDNEY DISEASE, STAGE IV (SEVERE) (HCC): Primary | ICD-10-CM

## 2021-02-03 DIAGNOSIS — I10 ESSENTIAL HYPERTENSION: ICD-10-CM

## 2021-02-03 DIAGNOSIS — N18.4 CHRONIC KIDNEY DISEASE, STAGE IV (SEVERE) (HCC): ICD-10-CM

## 2021-02-03 DIAGNOSIS — E03.2 HYPOTHYROIDISM DUE TO MEDICATION: ICD-10-CM

## 2021-02-03 DIAGNOSIS — E55.9 VITAMIN D DEFICIENCY: ICD-10-CM

## 2021-02-03 LAB
25(OH)D3 SERPL-MCNC: 42.5 NG/ML (ref 30–100)
ALBUMIN SERPL-MCNC: 4.2 G/DL (ref 3.5–5.2)
ALBUMIN UR-MCNC: 1.3 MG/DL
ALBUMIN/GLOB SERPL: 1.2 G/DL
ALP SERPL-CCNC: 67 U/L (ref 39–117)
ALT SERPL W P-5'-P-CCNC: 29 U/L (ref 1–33)
ANION GAP SERPL CALCULATED.3IONS-SCNC: 11.7 MMOL/L (ref 5–15)
AST SERPL-CCNC: 34 U/L (ref 1–32)
BASOPHILS # BLD AUTO: 0.09 10*3/MM3 (ref 0–0.2)
BASOPHILS NFR BLD AUTO: 1.2 % (ref 0–1.5)
BILIRUB SERPL-MCNC: 0.8 MG/DL (ref 0–1.2)
BUN SERPL-MCNC: 42 MG/DL (ref 8–23)
BUN/CREAT SERPL: 15.3 (ref 7–25)
CALCIUM SPEC-SCNC: 10.8 MG/DL (ref 8.6–10.5)
CHLORIDE SERPL-SCNC: 95 MMOL/L (ref 98–107)
CHOLEST SERPL-MCNC: 125 MG/DL (ref 0–200)
CO2 SERPL-SCNC: 30.3 MMOL/L (ref 22–29)
CREAT SERPL-MCNC: 2.74 MG/DL (ref 0.57–1)
CREAT UR-MCNC: 119.6 MG/DL
CREAT UR-MCNC: 120.6 MG/DL
CREAT UR-MCNC: 120.6 MG/DL
DEPRECATED RDW RBC AUTO: 43.7 FL (ref 37–54)
EOSINOPHIL # BLD AUTO: 0.12 10*3/MM3 (ref 0–0.4)
EOSINOPHIL NFR BLD AUTO: 1.6 % (ref 0.3–6.2)
ERYTHROCYTE [DISTWIDTH] IN BLOOD BY AUTOMATED COUNT: 13.5 % (ref 12.3–15.4)
GFR SERPL CREATININE-BSD FRML MDRD: 17 ML/MIN/1.73
GLOBULIN UR ELPH-MCNC: 3.6 GM/DL
GLUCOSE SERPL-MCNC: 159 MG/DL (ref 65–99)
HBA1C MFR BLD: 8.78 % (ref 4.8–5.6)
HCT VFR BLD AUTO: 39.9 % (ref 34–46.6)
HDLC SERPL-MCNC: 40 MG/DL (ref 40–60)
HGB BLD-MCNC: 13.2 G/DL (ref 12–15.9)
IMM GRANULOCYTES # BLD AUTO: 0.03 10*3/MM3 (ref 0–0.05)
IMM GRANULOCYTES NFR BLD AUTO: 0.4 % (ref 0–0.5)
LDLC SERPL CALC-MCNC: 57 MG/DL (ref 0–100)
LDLC/HDLC SERPL: 1.31 {RATIO}
LYMPHOCYTES # BLD AUTO: 2.12 10*3/MM3 (ref 0.7–3.1)
LYMPHOCYTES NFR BLD AUTO: 28.7 % (ref 19.6–45.3)
MAGNESIUM SERPL-MCNC: 2.2 MG/DL (ref 1.6–2.4)
MCH RBC QN AUTO: 29.3 PG (ref 26.6–33)
MCHC RBC AUTO-ENTMCNC: 33.1 G/DL (ref 31.5–35.7)
MCV RBC AUTO: 88.7 FL (ref 79–97)
MICROALBUMIN/CREAT UR: 10.8 MG/G
MONOCYTES # BLD AUTO: 0.68 10*3/MM3 (ref 0.1–0.9)
MONOCYTES NFR BLD AUTO: 9.2 % (ref 5–12)
NEUTROPHILS NFR BLD AUTO: 4.34 10*3/MM3 (ref 1.7–7)
NEUTROPHILS NFR BLD AUTO: 58.9 % (ref 42.7–76)
NRBC BLD AUTO-RTO: 0 /100 WBC (ref 0–0.2)
PHOSPHATE SERPL-MCNC: 3.6 MG/DL (ref 2.5–4.5)
PLATELET # BLD AUTO: 201 10*3/MM3 (ref 140–450)
PMV BLD AUTO: 12.5 FL (ref 6–12)
POTASSIUM SERPL-SCNC: 5.3 MMOL/L (ref 3.5–5.2)
PROT SERPL-MCNC: 7.8 G/DL (ref 6–8.5)
PROT UR-MCNC: 14 MG/DL
PROT UR-MCNC: 14 MG/DL
PROT/CREAT UR: 116.1 MG/G CREA (ref 0–200)
PROT/CREAT UR: 117.1 MG/G CREA (ref 0–200)
PTH-INTACT SERPL-MCNC: 12.6 PG/ML (ref 15–65)
RBC # BLD AUTO: 4.5 10*6/MM3 (ref 3.77–5.28)
SODIUM SERPL-SCNC: 137 MMOL/L (ref 136–145)
TRIGL SERPL-MCNC: 163 MG/DL (ref 0–150)
TSH SERPL DL<=0.05 MIU/L-ACNC: 1.1 UIU/ML (ref 0.27–4.2)
VIT B12 BLD-MCNC: 738 PG/ML (ref 211–946)
VLDLC SERPL-MCNC: 28 MG/DL (ref 5–40)
WBC # BLD AUTO: 7.38 10*3/MM3 (ref 3.4–10.8)

## 2021-02-03 PROCEDURE — 82570 ASSAY OF URINE CREATININE: CPT

## 2021-02-03 PROCEDURE — 82306 VITAMIN D 25 HYDROXY: CPT

## 2021-02-03 PROCEDURE — 85025 COMPLETE CBC W/AUTO DIFF WBC: CPT

## 2021-02-03 PROCEDURE — 83036 HEMOGLOBIN GLYCOSYLATED A1C: CPT

## 2021-02-03 PROCEDURE — 80061 LIPID PANEL: CPT

## 2021-02-03 PROCEDURE — 84100 ASSAY OF PHOSPHORUS: CPT

## 2021-02-03 PROCEDURE — 83735 ASSAY OF MAGNESIUM: CPT

## 2021-02-03 PROCEDURE — 84156 ASSAY OF PROTEIN URINE: CPT

## 2021-02-03 PROCEDURE — 83970 ASSAY OF PARATHORMONE: CPT

## 2021-02-03 PROCEDURE — 82607 VITAMIN B-12: CPT

## 2021-02-03 PROCEDURE — 80053 COMPREHEN METABOLIC PANEL: CPT

## 2021-02-03 PROCEDURE — 84443 ASSAY THYROID STIM HORMONE: CPT

## 2021-02-03 PROCEDURE — 82043 UR ALBUMIN QUANTITATIVE: CPT

## 2021-02-09 ENCOUNTER — TELEPHONE (OUTPATIENT)
Dept: ENDOCRINOLOGY | Facility: CLINIC | Age: 76
End: 2021-02-09

## 2021-02-09 NOTE — TELEPHONE ENCOUNTER
PATIENTS  CALLED AND STATED THAT DR. DIAZ NURSE CALLED LAST Friday AND TOLD PATIENT TO STOP TAKING 3 MEDS AND STARTED A NEW MED.  ALSO STATED THAT HE WANTED BLOOD WORK DONE, PATIENT WENT TO THE [facility] BUT THEY DID NOT HAVE ANY ORDERS.      I FOUND THE ORDERS IN PATIENTS CHART.  PATIENT IS REQUESTING THE ORDERS BE FAXED TO [facility].

## 2021-03-12 ENCOUNTER — LAB (OUTPATIENT)
Dept: LAB | Facility: HOSPITAL | Age: 76
End: 2021-03-12

## 2021-03-12 ENCOUNTER — TRANSCRIBE ORDERS (OUTPATIENT)
Dept: LAB | Facility: HOSPITAL | Age: 76
End: 2021-03-12

## 2021-03-12 DIAGNOSIS — N18.4 CHRONIC KIDNEY DISEASE, STAGE IV (SEVERE) (HCC): Primary | ICD-10-CM

## 2021-03-12 PROCEDURE — 80069 RENAL FUNCTION PANEL: CPT | Performed by: INTERNAL MEDICINE

## 2021-03-13 LAB
ALBUMIN SERPL-MCNC: 3.8 G/DL (ref 3.5–5.2)
ANION GAP SERPL CALCULATED.3IONS-SCNC: 14.3 MMOL/L (ref 5–15)
BUN SERPL-MCNC: 17 MG/DL (ref 8–23)
BUN/CREAT SERPL: 11.3 (ref 7–25)
CALCIUM SPEC-SCNC: 9.5 MG/DL (ref 8.6–10.5)
CHLORIDE SERPL-SCNC: 100 MMOL/L (ref 98–107)
CO2 SERPL-SCNC: 28.7 MMOL/L (ref 22–29)
CREAT SERPL-MCNC: 1.51 MG/DL (ref 0.57–1)
GFR SERPL CREATININE-BSD FRML MDRD: 34 ML/MIN/1.73
GLUCOSE SERPL-MCNC: 168 MG/DL (ref 65–99)
PHOSPHATE SERPL-MCNC: 4 MG/DL (ref 2.5–4.5)
POTASSIUM SERPL-SCNC: 3.9 MMOL/L (ref 3.5–5.2)
SODIUM SERPL-SCNC: 143 MMOL/L (ref 136–145)

## 2021-03-16 ENCOUNTER — TELEPHONE (OUTPATIENT)
Dept: ENDOCRINOLOGY | Facility: CLINIC | Age: 76
End: 2021-03-16

## 2021-03-16 RX ORDER — INSULIN GLARGINE 100 [IU]/ML
50 INJECTION, SOLUTION SUBCUTANEOUS NIGHTLY
Qty: 5 PEN | Refills: 3 | Status: SHIPPED | OUTPATIENT
Start: 2021-03-16 | End: 2021-06-25 | Stop reason: SDUPTHER

## 2021-03-16 NOTE — TELEPHONE ENCOUNTER
Pt came by needs Lantis pens sent to [facility] in [place] at [email or web address]  And needs lab orders placed and will do in [place] at

## 2021-03-25 ENCOUNTER — OFFICE VISIT (OUTPATIENT)
Dept: ENDOCRINOLOGY | Facility: CLINIC | Age: 76
End: 2021-03-25

## 2021-03-25 VITALS
BODY MASS INDEX: 23.75 KG/M2 | DIASTOLIC BLOOD PRESSURE: 58 MMHG | OXYGEN SATURATION: 98 % | WEIGHT: 151.3 LBS | SYSTOLIC BLOOD PRESSURE: 110 MMHG | HEART RATE: 70 BPM | HEIGHT: 67 IN

## 2021-03-25 DIAGNOSIS — E78.49 OTHER HYPERLIPIDEMIA: ICD-10-CM

## 2021-03-25 DIAGNOSIS — E03.2 HYPOTHYROIDISM DUE TO MEDICATION: ICD-10-CM

## 2021-03-25 DIAGNOSIS — E11.65 TYPE 2 DIABETES MELLITUS WITH HYPERGLYCEMIA, WITH LONG-TERM CURRENT USE OF INSULIN (HCC): ICD-10-CM

## 2021-03-25 DIAGNOSIS — E55.9 VITAMIN D DEFICIENCY: ICD-10-CM

## 2021-03-25 DIAGNOSIS — I10 ESSENTIAL HYPERTENSION: Primary | ICD-10-CM

## 2021-03-25 DIAGNOSIS — Z79.4 TYPE 2 DIABETES MELLITUS WITH HYPERGLYCEMIA, WITH LONG-TERM CURRENT USE OF INSULIN (HCC): ICD-10-CM

## 2021-03-25 PROCEDURE — 99214 OFFICE O/P EST MOD 30 MIN: CPT | Performed by: NURSE PRACTITIONER

## 2021-03-25 NOTE — PROGRESS NOTES
"Chief Complaint  Diabetes    Subjective          Jamilah Vee presents to [facility] ENDOCRINOLOGY  History of Present Illness       In office visit     75 year old female presents for follow up     Reason diabetes mellitus type 2     Duration diagnosed at age of 61      Onset of symptoms were gradual     Timing constant     Quality not controlled but improved     Severity is high          Severity (Complications/Hospitalizations)  Secondary Macrovascular Complications:  CAD   2 stents in May 2014  ICD - defibrillator  Secondary Microvascular Complications: +  Diabetic Nephropathy, No proteinuria, No Diabetic Retinopathy, No Diabetic Neuropathy     CKD stage III     Context  Diabetes Regimen:  Insulin,        Lab Results   Component Value Date    HGBA1C 8.78 (H) 02/03/2021                Blood Glucose Readings      this am was 102           Diet        Low carb             Exercise: none     Associated Signs/Symptoms  Hyperglycemic Symptoms:  None  Hypoglycemic Episodes:   documented hypoglycemia in the past            Hypothyroidism due to amiodarone      Duration -- 2019     timing constant     quality not controlled      Severity mild     Alleviating factors compliance with medication      Aggravating factors none     Lab Results   Component Value Date    TSH 1.100 02/03/2021                Modifying Factors/Comorbidities:  Hypertension, Hyperlipidemia         Review of Systems - Musculoskeletal ROS: positive for - joint pain             Objective   Vital Signs:   /58   Pulse 70   Ht 168.9 cm (66.5\")   Wt 68.6 kg (151 lb 4.8 oz)   SpO2 98%   BMI 24.05 kg/m²     Physical Exam  Constitutional:       Appearance: Normal appearance.   HENT:      Head: Normocephalic and atraumatic.      Right Ear: External ear normal.      Left Ear: External ear normal.      Nose: Nose normal.   Eyes:      Pupils: Pupils are equal, round, and reactive to light.   Cardiovascular:      Rate and Rhythm: " Regular rhythm.      Heart sounds: Normal heart sounds.   Pulmonary:      Breath sounds: Normal breath sounds.   Musculoskeletal:         General: Normal range of motion.      Cervical back: Normal range of motion and neck supple.   Skin:     Coloration: Skin is not pale.   Neurological:      General: No focal deficit present.      Mental Status: She is alert.   Psychiatric:         Mood and Affect: Mood normal.         Thought Content: Thought content normal.         Judgment: Judgment normal.        Result Review :   The following data was reviewed by: ZAHIRA Sher on 03/25/2021:  Common labs    Common Labsle 12/28/20 12/28/20 12/28/20 12/28/20 12/28/20 2/3/21 2/3/21 2/3/21 2/3/21 2/3/21 3/12/21    1046 1046 1046 1046 1046 1048 1048 1048 1048 1048    Glucose     137 (A)   159 (A)   168 (A)   BUN     61 (A)   42 (A)   17   Creatinine     2.69 (A)   2.74 (A)   1.51 (A)   eGFR Non  Am     17 (A)   17 (A)   34 (A)   Sodium     137   137   143   Potassium     4.9   5.3 (A)   3.9   Chloride     97 (A)   95 (A)   100   Calcium     9.9   10.8 (A)   9.5   Albumin     3.90   4.20   3.80   Total Bilirubin     0.6   0.8      Alkaline Phosphatase     73   67      AST (SGOT)     24   34 (A)      ALT (SGPT)     21   29      WBC  6.10    7.38        Hemoglobin  13.2    13.2        Hematocrit  38.6    39.9        Platelets  174    201        Total Cholesterol   122      125     Triglycerides   181 (A)      163 (A)     HDL Cholesterol   38 (A)      40     LDL Cholesterol    54      57     Hemoglobin A1C 8.10 (A)      8.78 (A)       Microalbumin, Urine    1.7      1.3    (A) Abnormal value                      Assessment and Plan    Diagnoses and all orders for this visit:    1. Essential hypertension (Primary)  -     CBC & Differential; Future  -     Comprehensive Metabolic Panel; Future  -     Hemoglobin A1c; Future  -     Lipid Panel; Future  -     Microalbumin / Creatinine Urine Ratio - Urine, Clean Catch;  Future  -     Protein / Creatinine Ratio, Urine - Urine, Clean Catch; Future  -     TSH; Future  -     Vitamin B12; Future  -     Vitamin D 25 Hydroxy; Future    2. Other hyperlipidemia  -     CBC & Differential; Future  -     Comprehensive Metabolic Panel; Future  -     Hemoglobin A1c; Future  -     Lipid Panel; Future  -     Microalbumin / Creatinine Urine Ratio - Urine, Clean Catch; Future  -     Protein / Creatinine Ratio, Urine - Urine, Clean Catch; Future  -     TSH; Future  -     Vitamin B12; Future  -     Vitamin D 25 Hydroxy; Future    3. Vitamin D deficiency  -     CBC & Differential; Future  -     Comprehensive Metabolic Panel; Future  -     Hemoglobin A1c; Future  -     Lipid Panel; Future  -     Microalbumin / Creatinine Urine Ratio - Urine, Clean Catch; Future  -     Protein / Creatinine Ratio, Urine - Urine, Clean Catch; Future  -     TSH; Future  -     Vitamin B12; Future  -     Vitamin D 25 Hydroxy; Future    4. Hypothyroidism due to medication  -     CBC & Differential; Future  -     Comprehensive Metabolic Panel; Future  -     Hemoglobin A1c; Future  -     Lipid Panel; Future  -     Microalbumin / Creatinine Urine Ratio - Urine, Clean Catch; Future  -     Protein / Creatinine Ratio, Urine - Urine, Clean Catch; Future  -     TSH; Future  -     Vitamin B12; Future  -     Vitamin D 25 Hydroxy; Future    5. Type 2 diabetes mellitus with hyperglycemia, with long-term current use of insulin (CMS/[facility])  -     CBC & Differential; Future  -     Comprehensive Metabolic Panel; Future  -     Hemoglobin A1c; Future  -     Lipid Panel; Future  -     Microalbumin / Creatinine Urine Ratio - Urine, Clean Catch; Future  -     Protein / Creatinine Ratio, Urine - Urine, Clean Catch; Future  -     TSH; Future  -     Vitamin B12; Future  -     Vitamin D 25 Hydroxy; Future               Glycemic Management            diabetes mellitus type 2      Lab Results   Component Value Date    HGBA1C 8.78 (H) 02/03/2021           Lantus taking  36 units --keep taking               if you wake up with a sugar in the morning  less than 100 -- decrease by 5 units and don't raise it again         Novolog      Giving 6 up to 10 units based on meals -     Missing dosage      Please cover all meals with mealtime insulin      151-200 : 2 units   201-250 : 4units  251-300 : 6units  above 300 : 8 units     +         --------  Previous regimen        Janumet 100/1000 mg with supper----stopped due to expensive      metformin 1000mg  stop due to GFR         ----------     stop glimepiride since not enough              Lipid Management           Total Cholesterol   Date Value Ref Range Status   02/03/2021 125 0 - 200 mg/dL Final     Triglycerides   Date Value Ref Range Status   02/03/2021 163 (H) 0 - 150 mg/dL Final   01/17/2020 116 <=149 mg/dL Final     HDL Cholesterol   Date Value Ref Range Status   02/03/2021 40 40 - 60 mg/dL Final   01/17/2020 23 (L) >=50 mg/dL Final     Comment:     Levels of HDL cholesterol above 60 mg/dL are considered a negative risk factor for coronary heart disease.   Source: NCEP ATP III Expert report, KRISTINA 2001; 285(19):2486-97; Circulation, 2002;106:3143.     LDL Cholesterol    Date Value Ref Range Status   02/03/2021 57 0 - 100 mg/dL Final   01/17/2020 29 1 - 129 mg/dL Final     Comment:     ATP III Classification of LDL Cholesterol          Optimal        (<100)      mg/dL          Near Optimal   (100-129)   mg/dL          Borderline High(130-159)   mg/dL          High           (160-189)   mg/dL          Very High      (>189)      mg/dL              Taking  lipitor 40 mg one night      Blood Pressure Management        Taking amiodarone 200 mg      Taking bumex 1 mg daily      Taking metoprolol 50 mg daily      Taking aldactone 25 mg daily                Microvascular Complication Monitoring     eye exam -- August 2020 , no DR         Neuropathy -- none          CKD stage III        Seeing                Component      Latest Ref Rng & Units 2/3/2021 2/3/2021          10:48 AM 10:48 AM   Microalbumin/Creatinine Ratio      mg/g 10.8    Creatinine, Urine      mg/dL 120.6 120.6   Microalbumin, Urine      mg/dL 1.3    Protein/Creatinine Ratio      0.0 - 200.0 mg/G Crea  116.1   Total Protein, Urine      mg/dL  14.0              Bone Health     Vitamin d def.     Taking vitamin d otc daily         Component      Latest Ref Rng & Units 2/3/2021   25 Hydroxy, Vitamin D      30.0 - 100.0 ng/ml 42.5                Other Diabetes Related Aspects              Hypothyroidism due to medication          Component      Latest Ref Rng & Units 11/16/2018   TSH Baseline      0.460 - 4.680 mIU/mL 7.000 (H)   Free T4      0.78 - 2.19 ng/dL 1.88   T3, Total      97.0 - 169.0 ng/dl 117.0   Thyrotropin Receptor Antibody      0.00 - 1.75 IU/L <0.50   Thyroid Stimulating Immunoglobulin      0.00 - 0.55 IU/L <0.10   Thyroid Peroxidase Antibody      0 - 34 IU/mL 12      Appears to be amiodarone induced                  Medication must me taken on an empty stomach at least one hour before food, drink and other medications. Only take with water. If taking vitamins or antiacids needs to be 4 hours before or after.        Lab Results   Component Value Date    TSH 1.100 02/03/2021        taking levothyroxine 75 mcg daily --- increase to 88 mcg daily                   Follow Up   No follow-ups on file.  Patient was given instructions and counseling regarding her condition or for health maintenance advice. Please see specific information pulled into the AVS if appropriate.

## 2021-06-21 ENCOUNTER — LAB (OUTPATIENT)
Dept: LAB | Facility: HOSPITAL | Age: 76
End: 2021-06-21

## 2021-06-21 DIAGNOSIS — E55.9 VITAMIN D DEFICIENCY: ICD-10-CM

## 2021-06-21 DIAGNOSIS — I10 ESSENTIAL HYPERTENSION: ICD-10-CM

## 2021-06-21 DIAGNOSIS — E03.2 HYPOTHYROIDISM DUE TO MEDICATION: ICD-10-CM

## 2021-06-21 DIAGNOSIS — E78.49 OTHER HYPERLIPIDEMIA: ICD-10-CM

## 2021-06-21 DIAGNOSIS — Z79.4 TYPE 2 DIABETES MELLITUS WITH HYPERGLYCEMIA, WITH LONG-TERM CURRENT USE OF INSULIN (HCC): ICD-10-CM

## 2021-06-21 DIAGNOSIS — E11.65 TYPE 2 DIABETES MELLITUS WITH HYPERGLYCEMIA, WITH LONG-TERM CURRENT USE OF INSULIN (HCC): ICD-10-CM

## 2021-06-21 LAB
25(OH)D3 SERPL-MCNC: 28.2 NG/ML
ALBUMIN SERPL-MCNC: 4 G/DL (ref 3.5–5.2)
ALBUMIN UR-MCNC: 2.4 MG/DL
ALBUMIN/GLOB SERPL: 1.5 G/DL
ALP SERPL-CCNC: 78 U/L (ref 39–117)
ALT SERPL W P-5'-P-CCNC: 19 U/L (ref 1–33)
ANION GAP SERPL CALCULATED.3IONS-SCNC: 8.5 MMOL/L (ref 5–15)
AST SERPL-CCNC: 28 U/L (ref 1–32)
BASOPHILS # BLD AUTO: 0.07 10*3/MM3 (ref 0–0.2)
BASOPHILS NFR BLD AUTO: 1.5 % (ref 0–1.5)
BILIRUB SERPL-MCNC: 0.7 MG/DL (ref 0–1.2)
BUN SERPL-MCNC: 23 MG/DL (ref 8–23)
BUN/CREAT SERPL: 16.2 (ref 7–25)
CALCIUM SPEC-SCNC: 9.1 MG/DL (ref 8.6–10.5)
CHLORIDE SERPL-SCNC: 101 MMOL/L (ref 98–107)
CHOLEST SERPL-MCNC: 106 MG/DL (ref 0–200)
CO2 SERPL-SCNC: 29.5 MMOL/L (ref 22–29)
CREAT SERPL-MCNC: 1.42 MG/DL (ref 0.57–1)
CREAT UR-MCNC: 126.2 MG/DL
CREAT UR-MCNC: 126.2 MG/DL
DEPRECATED RDW RBC AUTO: 43.3 FL (ref 37–54)
EOSINOPHIL # BLD AUTO: 0.09 10*3/MM3 (ref 0–0.4)
EOSINOPHIL NFR BLD AUTO: 1.9 % (ref 0.3–6.2)
ERYTHROCYTE [DISTWIDTH] IN BLOOD BY AUTOMATED COUNT: 13.4 % (ref 12.3–15.4)
GFR SERPL CREATININE-BSD FRML MDRD: 36 ML/MIN/1.73
GLOBULIN UR ELPH-MCNC: 2.7 GM/DL
GLUCOSE SERPL-MCNC: 166 MG/DL (ref 65–99)
HBA1C MFR BLD: 8.5 % (ref 4.8–5.6)
HCT VFR BLD AUTO: 38.7 % (ref 34–46.6)
HDLC SERPL-MCNC: 41 MG/DL (ref 40–60)
HGB BLD-MCNC: 12.6 G/DL (ref 12–15.9)
LDLC SERPL CALC-MCNC: 45 MG/DL (ref 0–100)
LDLC/HDLC SERPL: 1.05 {RATIO}
LYMPHOCYTES # BLD AUTO: 1.1 10*3/MM3 (ref 0.7–3.1)
LYMPHOCYTES NFR BLD AUTO: 23.4 % (ref 19.6–45.3)
MCH RBC QN AUTO: 28.6 PG (ref 26.6–33)
MCHC RBC AUTO-ENTMCNC: 32.6 G/DL (ref 31.5–35.7)
MCV RBC AUTO: 87.8 FL (ref 79–97)
MICROALBUMIN/CREAT UR: 19 MG/G
MONOCYTES # BLD AUTO: 0.46 10*3/MM3 (ref 0.1–0.9)
MONOCYTES NFR BLD AUTO: 9.8 % (ref 5–12)
NEUTROPHILS NFR BLD AUTO: 2.97 10*3/MM3 (ref 1.7–7)
NEUTROPHILS NFR BLD AUTO: 63 % (ref 42.7–76)
PLATELET # BLD AUTO: 135 10*3/MM3 (ref 140–450)
PMV BLD AUTO: 13.6 FL (ref 6–12)
POTASSIUM SERPL-SCNC: 3.9 MMOL/L (ref 3.5–5.2)
PROT SERPL-MCNC: 6.7 G/DL (ref 6–8.5)
PROT UR-MCNC: 22 MG/DL
PROT/CREAT UR: 174.3 MG/G CREA (ref 0–200)
RBC # BLD AUTO: 4.41 10*6/MM3 (ref 3.77–5.28)
SODIUM SERPL-SCNC: 139 MMOL/L (ref 136–145)
TRIGL SERPL-MCNC: 109 MG/DL (ref 0–150)
TSH SERPL DL<=0.05 MIU/L-ACNC: 0.07 UIU/ML (ref 0.27–4.2)
VIT B12 BLD-MCNC: 552 PG/ML (ref 211–946)
VLDLC SERPL-MCNC: 20 MG/DL (ref 5–40)
WBC # BLD AUTO: 4.71 10*3/MM3 (ref 3.4–10.8)

## 2021-06-21 PROCEDURE — 80061 LIPID PANEL: CPT

## 2021-06-21 PROCEDURE — 84443 ASSAY THYROID STIM HORMONE: CPT

## 2021-06-21 PROCEDURE — 80053 COMPREHEN METABOLIC PANEL: CPT

## 2021-06-21 PROCEDURE — 85025 COMPLETE CBC W/AUTO DIFF WBC: CPT

## 2021-06-21 PROCEDURE — 83036 HEMOGLOBIN GLYCOSYLATED A1C: CPT

## 2021-06-21 PROCEDURE — 84156 ASSAY OF PROTEIN URINE: CPT

## 2021-06-21 PROCEDURE — 82607 VITAMIN B-12: CPT

## 2021-06-21 PROCEDURE — 82570 ASSAY OF URINE CREATININE: CPT

## 2021-06-21 PROCEDURE — 82306 VITAMIN D 25 HYDROXY: CPT

## 2021-06-21 PROCEDURE — 82043 UR ALBUMIN QUANTITATIVE: CPT

## 2021-06-25 ENCOUNTER — OFFICE VISIT (OUTPATIENT)
Dept: ENDOCRINOLOGY | Facility: CLINIC | Age: 76
End: 2021-06-25

## 2021-06-25 VITALS
HEART RATE: 60 BPM | HEIGHT: 67 IN | BODY MASS INDEX: 23.23 KG/M2 | OXYGEN SATURATION: 97 % | SYSTOLIC BLOOD PRESSURE: 100 MMHG | DIASTOLIC BLOOD PRESSURE: 58 MMHG | WEIGHT: 148 LBS

## 2021-06-25 DIAGNOSIS — E55.9 VITAMIN D DEFICIENCY: ICD-10-CM

## 2021-06-25 DIAGNOSIS — E11.65 TYPE 2 DIABETES MELLITUS WITH HYPERGLYCEMIA, WITH LONG-TERM CURRENT USE OF INSULIN (HCC): ICD-10-CM

## 2021-06-25 DIAGNOSIS — E03.2 HYPOTHYROIDISM DUE TO MEDICATION: ICD-10-CM

## 2021-06-25 DIAGNOSIS — Z79.4 TYPE 2 DIABETES MELLITUS WITH HYPERGLYCEMIA, WITH LONG-TERM CURRENT USE OF INSULIN (HCC): ICD-10-CM

## 2021-06-25 DIAGNOSIS — I10 ESSENTIAL HYPERTENSION: Primary | ICD-10-CM

## 2021-06-25 DIAGNOSIS — E78.49 OTHER HYPERLIPIDEMIA: ICD-10-CM

## 2021-06-25 PROCEDURE — 99214 OFFICE O/P EST MOD 30 MIN: CPT | Performed by: NURSE PRACTITIONER

## 2021-06-25 RX ORDER — LEVOTHYROXINE SODIUM 0.07 MG/1
75 TABLET ORAL DAILY
Qty: 30 TABLET | Refills: 11 | Status: SHIPPED | OUTPATIENT
Start: 2021-06-25 | End: 2022-06-25

## 2021-06-25 RX ORDER — INSULIN GLARGINE 100 [IU]/ML
50 INJECTION, SOLUTION SUBCUTANEOUS NIGHTLY
Qty: 5 PEN | Refills: 3 | Status: SHIPPED | OUTPATIENT
Start: 2021-06-25

## 2021-06-25 NOTE — PROGRESS NOTES
"Chief Complaint  Diabetes    Subjective          Jamilah Vee presents to [facility] ENDOCRINOLOGY  History of Present Illness     In office visit       In office visit      75 year old female presents for follow up      Reason diabetes mellitus type 2      Duration diagnosed at age of 61      Onset of symptoms were gradual     Timing constant     Quality not controlled but improved     Severity is high           Severity (Complications/Hospitalizations)  Secondary Macrovascular Complications:  CAD   2 stents in May 2014  ICD - defibrillator  Secondary Microvascular Complications: +  Diabetic Nephropathy, No proteinuria, No Diabetic Retinopathy, No Diabetic Neuropathy     CKD stage III     Context  Diabetes Regimen:  Insulin,        Lab Results   Component Value Date    HGBA1C 8.50 (H) 06/21/2021             Blood Glucose Readings      this am was 102            Diet        Low carb              Exercise: none     Associated Signs/Symptoms  Hyperglycemic Symptoms:  None  Hypoglycemic Episodes:   documented hypoglycemia in the past            Hypothyroidism due to amiodarone      Duration -- 2019     timing constant     quality not controlled      Severity mild     Alleviating factors compliance with medication      Aggravating factors none     Lab Results   Component Value Date    TSH 0.068 (L) 06/21/2021             Modifying Factors/Comorbidities:  Hypertension, Hyperlipidemia                Objective   Vital Signs:   /58   Pulse 60   Ht 168.9 cm (66.5\")   Wt 67.1 kg (148 lb)   SpO2 97%   BMI 23.53 kg/m²     Physical Exam  Constitutional:       Appearance: Normal appearance.   Cardiovascular:      Rate and Rhythm: Regular rhythm.      Heart sounds: Normal heart sounds.   Pulmonary:      Breath sounds: Normal breath sounds.   Musculoskeletal:         General: Normal range of motion.      Cervical back: Normal range of motion.   Skin:     Coloration: Skin is not pale.   Neurological: "      General: No focal deficit present.      Mental Status: She is alert.   Psychiatric:         Mood and Affect: Mood normal.         Thought Content: Thought content normal.         Judgment: Judgment normal.        Result Review :   The following data was reviewed by: ZAHIRA Sher on 06/25/2021:  Common labs    Common Labsle 2/3/21 2/3/21 2/3/21 2/3/21 2/3/21 3/12/21 6/21/21 6/21/21 6/21/21 6/21/21 6/21/21    1048 1048 1048 1048 1048  1013 1013 1013 1013 1013   Glucose   159 (A)   168 (A)     166 (A)   BUN   42 (A)   17     23   Creatinine   2.74 (A)   1.51 (A)     1.42 (A)   eGFR Non  Am   17 (A)   34 (A)     36 (A)   Sodium   137   143     139   Potassium   5.3 (A)   3.9     3.9   Chloride   95 (A)   100     101   Calcium   10.8 (A)   9.5     9.1   Albumin   4.20   3.80     4.00   Total Bilirubin   0.8        0.7   Alkaline Phosphatase   67        78   AST (SGOT)   34 (A)        28   ALT (SGPT)   29        19   WBC 7.38       4.71      Hemoglobin 13.2       12.6      Hematocrit 39.9       38.7      Platelets 201       135 (A)      Total Cholesterol    125      106    Triglycerides    163 (A)      109    HDL Cholesterol    40      41    LDL Cholesterol     57      45    Hemoglobin A1C  8.78 (A)     8.50 (A)       Microalbumin, Urine     1.3    2.4     (A) Abnormal value                      Assessment and Plan    Diagnoses and all orders for this visit:    1. Essential hypertension (Primary)  -     CBC & Differential; Future  -     Comprehensive Metabolic Panel; Future  -     Hemoglobin A1c; Future  -     TSH; Future  -     Vitamin D 25 Hydroxy; Future    2. Vitamin D deficiency  -     CBC & Differential; Future  -     Comprehensive Metabolic Panel; Future  -     Hemoglobin A1c; Future  -     TSH; Future  -     Vitamin D 25 Hydroxy; Future    3. Hypothyroidism due to medication  -     CBC & Differential; Future  -     Comprehensive Metabolic Panel; Future  -     Hemoglobin A1c; Future  -      TSH; Future  -     Vitamin D 25 Hydroxy; Future    4. Other hyperlipidemia  -     CBC & Differential; Future  -     Comprehensive Metabolic Panel; Future  -     Hemoglobin A1c; Future  -     TSH; Future  -     Vitamin D 25 Hydroxy; Future    5. Type 2 diabetes mellitus with hyperglycemia, with long-term current use of insulin (CMS/[facility])  -     CBC & Differential; Future  -     Comprehensive Metabolic Panel; Future  -     Hemoglobin A1c; Future  -     TSH; Future  -     Vitamin D 25 Hydroxy; Future    Other orders  -     levothyroxine (Synthroid) 75 MCG tablet; Take 1 tablet by mouth Daily.  Dispense: 30 tablet; Refill: 11  -     Insulin Glargine (Lantus SoloStar) 100 UNIT/ML injection pen; Inject 50 Units under the skin into the appropriate area as directed Every Night.  Dispense: 5 pen; Refill: 3  -     glucose blood test strip; Test 4 times daily,E11.9  Dispense: 120 each; Refill: 11               Glycemic Management            diabetes mellitus type 2      Lab Results   Component Value Date    HGBA1C 8.50 (H) 06/21/2021          Lantus taking  34 units --increase to 36 units               if you wake up with a sugar in the morning  less than 100 -- decrease by 5 units and don't raise it again         Novolog      Giving 6 up to 10 units based on meals -     Missing dosage      Please cover all meals with mealtime insulin      151-200 : 2 units   201-250 : 4units  251-300 : 6units  above 300 : 8 units     +         --------  Previous regimen        Janumet 100/1000 mg with supper----stopped due to expensive      metformin 1000mg  stop due to GFR         ----------     stop glimepiride since not enough              Lipid Management           Total Cholesterol   Date Value Ref Range Status   06/21/2021 106 0 - 200 mg/dL Final     Triglycerides   Date Value Ref Range Status   06/21/2021 109 0 - 150 mg/dL Final   01/17/2020 116 <=149 mg/dL Final     HDL Cholesterol   Date Value Ref Range Status   06/21/2021 41 40 - 60  mg/dL Final   01/17/2020 23 (L) >=50 mg/dL Final     Comment:     Levels of HDL cholesterol above 60 mg/dL are considered a negative risk factor for coronary heart disease.   Source: NCEP ATP III Expert report, KRISTINA 2001; 28519):2486-97; Circulation, 2002;106:3143.     LDL Cholesterol    Date Value Ref Range Status   06/21/2021 45 0 - 100 mg/dL Final   01/17/2020 29 1 - 129 mg/dL Final     Comment:     ATP III Classification of LDL Cholesterol          Optimal        (<100)      mg/dL          Near Optimal   (100-129)   mg/dL          Borderline High(130-159)   mg/dL          High           (160-189)   mg/dL          Very High      (>189)      mg/dL                 Taking  lipitor 40 mg one night      Blood Pressure Management        Taking amiodarone 200 mg      Taking bumex 1 mg daily      Taking metoprolol 50 mg daily      Taking aldactone 25 mg daily                Microvascular Complication Monitoring     eye exam -- August 2020 , no DR         Neuropathy -- none          CKD stage III        Seeing                Component      Latest Ref Rng & Units 2/3/2021 2/3/2021          10:48 AM 10:48 AM   Microalbumin/Creatinine Ratio      mg/g 10.8     Creatinine, Urine      mg/dL 120.6 120.6   Microalbumin, Urine      mg/dL 1.3     Protein/Creatinine Ratio      0.0 - 200.0 mg/G Crea   116.1   Total Protein, Urine      mg/dL   14.0               Bone Health     Vitamin d def.     Taking vitamin d otc daily         Component      Latest Ref Rng & Units 2/3/2021   25 Hydroxy, Vitamin D      30.0 - 100.0 ng/ml 42.5                  Other Diabetes Related Aspects              Hypothyroidism due to medication          Component      Latest Ref Rng & Units 11/16/2018   TSH Baseline      0.460 - 4.680 mIU/mL 7.000 (H)   Free T4      0.78 - 2.19 ng/dL 1.88   T3, Total      97.0 - 169.0 ng/dl 117.0   Thyrotropin Receptor Antibody      0.00 - 1.75 IU/L <0.50   Thyroid Stimulating Immunoglobulin      0.00 - 0.55 IU/L  <0.10   Thyroid Peroxidase Antibody      0 - 34 IU/mL 12      Appears to be amiodarone induced                  Medication must me taken on an empty stomach at least one hour before food, drink and other medications. Only take with water. If taking vitamins or antiacids needs to be 4 hours before or after.        Lab Results   Component Value Date    TSH 0.068 (L) 06/21/2021        taking levothyroxine 88 mcg daily --decrease to 75 mcg daily                Follow Up   No follow-ups on file.  Patient was given instructions and counseling regarding her condition or for health maintenance advice. Please see specific information pulled into the AVS if appropriate.

## 2021-09-09 ENCOUNTER — TELEPHONE (OUTPATIENT)
Dept: ENDOCRINOLOGY | Facility: CLINIC | Age: 76
End: 2021-09-09

## 2021-09-09 RX ORDER — INSULIN ASPART 100 [IU]/ML
15 INJECTION, SOLUTION INTRAVENOUS; SUBCUTANEOUS
Qty: 5 PEN | Refills: 11 | Status: SHIPPED | OUTPATIENT
Start: 2021-09-09

## 2021-09-09 NOTE — TELEPHONE ENCOUNTER
PT called stating she is needing a refill on her FreeStyle test strips and her Novolog Flex pen sent to [facility] in [place].

## 2021-09-22 ENCOUNTER — LAB (OUTPATIENT)
Dept: LAB | Facility: HOSPITAL | Age: 76
End: 2021-09-22

## 2021-09-22 DIAGNOSIS — E55.9 VITAMIN D DEFICIENCY: ICD-10-CM

## 2021-09-22 DIAGNOSIS — E03.2 HYPOTHYROIDISM DUE TO MEDICATION: ICD-10-CM

## 2021-09-22 DIAGNOSIS — E11.65 TYPE 2 DIABETES MELLITUS WITH HYPERGLYCEMIA, WITH LONG-TERM CURRENT USE OF INSULIN (HCC): ICD-10-CM

## 2021-09-22 DIAGNOSIS — I10 ESSENTIAL HYPERTENSION: ICD-10-CM

## 2021-09-22 DIAGNOSIS — E78.49 OTHER HYPERLIPIDEMIA: ICD-10-CM

## 2021-09-22 DIAGNOSIS — Z79.4 TYPE 2 DIABETES MELLITUS WITH HYPERGLYCEMIA, WITH LONG-TERM CURRENT USE OF INSULIN (HCC): ICD-10-CM

## 2021-09-22 LAB
25(OH)D3 SERPL-MCNC: 33.2 NG/ML
ALBUMIN SERPL-MCNC: 3.9 G/DL (ref 3.5–5.2)
ALBUMIN/GLOB SERPL: 1.1 G/DL
ALP SERPL-CCNC: 74 U/L (ref 39–117)
ALT SERPL W P-5'-P-CCNC: 24 U/L (ref 1–33)
ANION GAP SERPL CALCULATED.3IONS-SCNC: 10.7 MMOL/L (ref 5–15)
AST SERPL-CCNC: 30 U/L (ref 1–32)
BASOPHILS # BLD AUTO: 0.08 10*3/MM3 (ref 0–0.2)
BASOPHILS NFR BLD AUTO: 1.2 % (ref 0–1.5)
BILIRUB SERPL-MCNC: 0.9 MG/DL (ref 0–1.2)
BUN SERPL-MCNC: 23 MG/DL (ref 8–23)
BUN/CREAT SERPL: 14.4 (ref 7–25)
CALCIUM SPEC-SCNC: 9.8 MG/DL (ref 8.6–10.5)
CHLORIDE SERPL-SCNC: 100 MMOL/L (ref 98–107)
CO2 SERPL-SCNC: 29.3 MMOL/L (ref 22–29)
CREAT SERPL-MCNC: 1.6 MG/DL (ref 0.57–1)
DEPRECATED RDW RBC AUTO: 44.1 FL (ref 37–54)
EOSINOPHIL # BLD AUTO: 0.17 10*3/MM3 (ref 0–0.4)
EOSINOPHIL NFR BLD AUTO: 2.5 % (ref 0.3–6.2)
ERYTHROCYTE [DISTWIDTH] IN BLOOD BY AUTOMATED COUNT: 13.8 % (ref 12.3–15.4)
GFR SERPL CREATININE-BSD FRML MDRD: 31 ML/MIN/1.73
GLOBULIN UR ELPH-MCNC: 3.6 GM/DL
GLUCOSE SERPL-MCNC: 111 MG/DL (ref 65–99)
HBA1C MFR BLD: 9.46 % (ref 4.8–5.6)
HCT VFR BLD AUTO: 38.2 % (ref 34–46.6)
HGB BLD-MCNC: 12.8 G/DL (ref 12–15.9)
IMM GRANULOCYTES # BLD AUTO: 0.03 10*3/MM3 (ref 0–0.05)
IMM GRANULOCYTES NFR BLD AUTO: 0.4 % (ref 0–0.5)
LYMPHOCYTES # BLD AUTO: 1.18 10*3/MM3 (ref 0.7–3.1)
LYMPHOCYTES NFR BLD AUTO: 17.1 % (ref 19.6–45.3)
MCH RBC QN AUTO: 29.3 PG (ref 26.6–33)
MCHC RBC AUTO-ENTMCNC: 33.5 G/DL (ref 31.5–35.7)
MCV RBC AUTO: 87.4 FL (ref 79–97)
MONOCYTES # BLD AUTO: 0.54 10*3/MM3 (ref 0.1–0.9)
MONOCYTES NFR BLD AUTO: 7.8 % (ref 5–12)
NEUTROPHILS NFR BLD AUTO: 4.91 10*3/MM3 (ref 1.7–7)
NEUTROPHILS NFR BLD AUTO: 71 % (ref 42.7–76)
NRBC BLD AUTO-RTO: 0 /100 WBC (ref 0–0.2)
PLATELET # BLD AUTO: 168 10*3/MM3 (ref 140–450)
PMV BLD AUTO: 12.8 FL (ref 6–12)
POTASSIUM SERPL-SCNC: 4.2 MMOL/L (ref 3.5–5.2)
PROT SERPL-MCNC: 7.5 G/DL (ref 6–8.5)
RBC # BLD AUTO: 4.37 10*6/MM3 (ref 3.77–5.28)
SODIUM SERPL-SCNC: 140 MMOL/L (ref 136–145)
TSH SERPL DL<=0.05 MIU/L-ACNC: 0.44 UIU/ML (ref 0.27–4.2)
WBC # BLD AUTO: 6.91 10*3/MM3 (ref 3.4–10.8)

## 2021-09-22 PROCEDURE — 83036 HEMOGLOBIN GLYCOSYLATED A1C: CPT

## 2021-09-22 PROCEDURE — 80053 COMPREHEN METABOLIC PANEL: CPT

## 2021-09-22 PROCEDURE — 82306 VITAMIN D 25 HYDROXY: CPT

## 2021-09-22 PROCEDURE — 85025 COMPLETE CBC W/AUTO DIFF WBC: CPT

## 2021-09-22 PROCEDURE — 84443 ASSAY THYROID STIM HORMONE: CPT

## 2021-09-24 ENCOUNTER — OFFICE VISIT (OUTPATIENT)
Dept: ENDOCRINOLOGY | Facility: CLINIC | Age: 76
End: 2021-09-24

## 2021-09-24 VITALS
SYSTOLIC BLOOD PRESSURE: 102 MMHG | WEIGHT: 151.2 LBS | HEIGHT: 66 IN | DIASTOLIC BLOOD PRESSURE: 58 MMHG | OXYGEN SATURATION: 92 % | BODY MASS INDEX: 24.3 KG/M2 | HEART RATE: 68 BPM

## 2021-09-24 DIAGNOSIS — E78.49 OTHER HYPERLIPIDEMIA: ICD-10-CM

## 2021-09-24 DIAGNOSIS — E55.9 VITAMIN D DEFICIENCY, UNSPECIFIED: ICD-10-CM

## 2021-09-24 DIAGNOSIS — Z79.4 TYPE 2 DIABETES MELLITUS WITH HYPERGLYCEMIA, WITH LONG-TERM CURRENT USE OF INSULIN (HCC): Primary | ICD-10-CM

## 2021-09-24 DIAGNOSIS — E03.2 HYPOTHYROIDISM DUE TO MEDICATION: ICD-10-CM

## 2021-09-24 DIAGNOSIS — I10 ESSENTIAL HYPERTENSION: ICD-10-CM

## 2021-09-24 DIAGNOSIS — E11.65 TYPE 2 DIABETES MELLITUS WITH HYPERGLYCEMIA, WITH LONG-TERM CURRENT USE OF INSULIN (HCC): Primary | ICD-10-CM

## 2021-09-24 PROCEDURE — 99214 OFFICE O/P EST MOD 30 MIN: CPT | Performed by: NURSE PRACTITIONER

## 2021-09-24 NOTE — PROGRESS NOTES
"Chief Complaint  Diabetes and Hypertension    Subjective          Jamilah Vee presents to [facility]  History of Present Illness         In office visit    76-year-old female presents for follow-up    Reason diabetes mellitus type 2    Timing constant    Quality not controlled      Lab Results   Component Value Date    HGBA1C 9.46 (H) 09/22/2021         Duration diagnosed at the age of 61    Onset gradual    Severity is high    Macrovascular complications CAD, stents x2 in May 2014    She has ICD with defibrillator    Microvascular complications nephropathy and CKD stage III    Current diabetes regimen    Insulin      Patient is not giving her mealtime insulin    She takes states that she does not eat anything    Through  discussion with the patient she does eat but she only eats sweets or high carbs    For breakfast she had a muffin and a banana did not give insulin    And she eats lots of candy            Current glucose monitoring    Fingersticks    4 times daily                    Hypothyroidism due to amiodarone    Timing constant    Quality  Controlled     Severity is high    Duration since 2019        Lab Results   Component Value Date    TSH 0.442 09/22/2021                 Review of Systems - General ROS: negative      Objective   Vital Signs:   /58   Pulse 68   Ht 167.6 cm (66\")   Wt 68.6 kg (151 lb 3.2 oz)   SpO2 92%   BMI 24.40 kg/m²     Physical Exam  Constitutional:       Appearance: Normal appearance.   Cardiovascular:      Rate and Rhythm: Regular rhythm.      Heart sounds: Normal heart sounds.   Pulmonary:      Breath sounds: Normal breath sounds.   Musculoskeletal:      Cervical back: Normal range of motion.   Neurological:      Mental Status: She is alert.        Result Review :   The following data was reviewed by: ZAHIRA Sher on 09/24/2021:  Common labs    Common Labsle 3/12/21 6/21/21 6/21/21 6/21/21 6/21/21 6/21/21 9/22/21 " 9/22/21 9/22/21     1013 1013 1013 1013 1013 1048 1048 1048   Glucose 168 (A)     166 (A)   111 (A)   BUN 17     23   23   Creatinine 1.51 (A)     1.42 (A)   1.60 (A)   eGFR Non  Am 34 (A)     36 (A)   31 (A)   Sodium 143     139   140   Potassium 3.9     3.9   4.2   Chloride 100     101   100   Calcium 9.5     9.1   9.8   Albumin 3.80     4.00   3.90   Total Bilirubin      0.7   0.9   Alkaline Phosphatase      78   74   AST (SGOT)      28   30   ALT (SGPT)      19   24   WBC   4.71     6.91    Hemoglobin   12.6     12.8    Hematocrit   38.7     38.2    Platelets   135 (A)     168    Total Cholesterol     106       Triglycerides     109       HDL Cholesterol     41       LDL Cholesterol      45       Hemoglobin A1C  8.50 (A)     9.46 (A)     Microalbumin, Urine    2.4        (A) Abnormal value                      Assessment and Plan    Diagnoses and all orders for this visit:    1. Type 2 diabetes mellitus with hyperglycemia, with long-term current use of insulin (CMS/[facility]) (Primary)  -     CBC & Differential; Future  -     Comprehensive Metabolic Panel; Future  -     Hemoglobin A1c; Future  -     TSH; Future  -     Vitamin D 25 Hydroxy; Future  -     Vitamin B12; Future  -     Protein / Creatinine Ratio, Urine - Urine, Clean Catch; Future  -     Microalbumin / Creatinine Urine Ratio - Urine, Clean Catch; Future  -     Lipid Panel; Future    2. Hypothyroidism due to medication  -     CBC & Differential; Future  -     Comprehensive Metabolic Panel; Future  -     Hemoglobin A1c; Future  -     TSH; Future  -     Vitamin D 25 Hydroxy; Future  -     Vitamin B12; Future  -     Protein / Creatinine Ratio, Urine - Urine, Clean Catch; Future  -     Microalbumin / Creatinine Urine Ratio - Urine, Clean Catch; Future  -     Lipid Panel; Future    3. Other hyperlipidemia  -     CBC & Differential; Future  -     Comprehensive Metabolic Panel; Future  -     Hemoglobin A1c; Future  -     TSH; Future  -     Vitamin D 25  Hydroxy; Future  -     Vitamin B12; Future  -     Protein / Creatinine Ratio, Urine - Urine, Clean Catch; Future  -     Microalbumin / Creatinine Urine Ratio - Urine, Clean Catch; Future  -     Lipid Panel; Future    4. Essential hypertension  -     CBC & Differential; Future  -     Comprehensive Metabolic Panel; Future  -     Hemoglobin A1c; Future  -     TSH; Future  -     Vitamin D 25 Hydroxy; Future  -     Vitamin B12; Future  -     Protein / Creatinine Ratio, Urine - Urine, Clean Catch; Future  -     Microalbumin / Creatinine Urine Ratio - Urine, Clean Catch; Future  -     Lipid Panel; Future    5. Vitamin D deficiency, unspecified   -     Vitamin D 25 Hydroxy; Future           Glycemic Management            diabetes mellitus type 2        Lab Results   Component Value Date    HGBA1C 9.46 (H) 09/22/2021             Lantus taking  36 units  this dose on her fasting blood work her blood sugar was 111              if you wake up with a sugar in the morning  less than 100 -- decrease by 5 units and don't raise it again         Novolog        Is not given the NovoLog    Discussed with the patient that the high carb foods in the sweets and sugars that she eats are causing her blood glucose levels to rise even though she is not eating what she thinks is a real meal she is still eating and she is only eating sweets    She needs to start with 3 units before her sweets and high carb food if 3 units is not enough she needs to go to 4 units    I told  the patient her blood sugars will not improve unless she covers these things with insulin       Please cover all meals with mealtime insulin      151-200 : 2 units   201-250 : 4units  251-300 : 6units  above 300 : 8 units     +         --------  Previous regimen        Janumet 100/1000 mg with supper----stopped due to expensive      metformin 1000mg  stop due to GFR         ----------     stop glimepiride since not enough              Lipid Management        Total Cholesterol    Date Value Ref Range Status   06/21/2021 106 0 - 200 mg/dL Final     Triglycerides   Date Value Ref Range Status   06/21/2021 109 0 - 150 mg/dL Final   01/17/2020 116 <=149 mg/dL Final     HDL Cholesterol   Date Value Ref Range Status   06/21/2021 41 40 - 60 mg/dL Final   01/17/2020 23 (L) >=50 mg/dL Final     Comment:     Levels of HDL cholesterol above 60 mg/dL are considered a negative risk factor for coronary heart disease.   Source: NCEP ATP III Expert report, KRISTINA 2001; 28519):2486-97; Circulation, 2002;106:3143.     LDL Cholesterol    Date Value Ref Range Status   06/21/2021 45 0 - 100 mg/dL Final   01/17/2020 29 1 - 129 mg/dL Final     Comment:     ATP III Classification of LDL Cholesterol          Optimal        (<100)      mg/dL          Near Optimal   (100-129)   mg/dL          Borderline High(130-159)   mg/dL          High           (160-189)   mg/dL          Very High      (>189)      mg/dL                    Taking  lipitor 40 mg one night      Blood Pressure Management        Taking amiodarone 200 mg      Taking bumex 1 mg daily      Taking metoprolol 50 mg daily      Taking aldactone 25 mg daily                Microvascular Complication Monitoring     eye exam -- August 2020 , no DR         Neuropathy -- none          CKD stage III        Seeing                           Bone Health     Vitamin d def.     Taking vitamin d otc daily                       Other Diabetes Related Aspects              Hypothyroidism due to medication          Component      Latest Ref Rng & Units 11/16/2018   TSH Baseline      0.460 - 4.680 mIU/mL 7.000 (H)   Free T4      0.78 - 2.19 ng/dL 1.88   T3, Total      97.0 - 169.0 ng/dl 117.0   Thyrotropin Receptor Antibody      0.00 - 1.75 IU/L <0.50   Thyroid Stimulating Immunoglobulin      0.00 - 0.55 IU/L <0.10   Thyroid Peroxidase Antibody      0 - 34 IU/mL 12      Appears to be amiodarone induced                  Medication must me taken on an empty stomach at  least one hour before food, drink and other medications. Only take with water. If taking vitamins or antiacids needs to be 4 hours before or after.     Lab Results   Component Value Date    TSH 0.442 09/22/2021       Keep taking levothyroxine 75 mcg daily             Follow Up   Return in about 3 months (around 12/24/2021) for Recheck.  Patient was given instructions and counseling regarding her condition or for health maintenance advice. Please see specific information pulled into the AVS if appropriate.         This document has been electronically signed by ZAHIRA Sher on September 24, 2021 15:05 CDT.